# Patient Record
Sex: MALE | Race: WHITE | HISPANIC OR LATINO | Employment: FULL TIME | URBAN - METROPOLITAN AREA
[De-identification: names, ages, dates, MRNs, and addresses within clinical notes are randomized per-mention and may not be internally consistent; named-entity substitution may affect disease eponyms.]

---

## 2019-05-06 ENCOUNTER — PREP FOR PROCEDURE (OUTPATIENT)
Dept: UROLOGY | Facility: CLINIC | Age: 44
End: 2019-05-06

## 2019-05-06 ENCOUNTER — APPOINTMENT (EMERGENCY)
Dept: RADIOLOGY | Facility: HOSPITAL | Age: 44
End: 2019-05-06
Payer: COMMERCIAL

## 2019-05-06 ENCOUNTER — APPOINTMENT (OUTPATIENT)
Dept: URGENT CARE | Age: 44
End: 2019-05-06
Payer: COMMERCIAL

## 2019-05-06 ENCOUNTER — ANESTHESIA (OUTPATIENT)
Dept: PERIOP | Facility: HOSPITAL | Age: 44
End: 2019-05-06
Payer: COMMERCIAL

## 2019-05-06 ENCOUNTER — HOSPITAL ENCOUNTER (OUTPATIENT)
Facility: HOSPITAL | Age: 44
Setting detail: OBSERVATION
Discharge: HOME | End: 2019-05-07
Attending: EMERGENCY MEDICINE | Admitting: INTERNAL MEDICINE
Payer: COMMERCIAL

## 2019-05-06 ENCOUNTER — TELEPHONE (OUTPATIENT)
Dept: UROLOGY | Facility: CLINIC | Age: 44
End: 2019-05-06

## 2019-05-06 ENCOUNTER — APPOINTMENT (OUTPATIENT)
Dept: RADIOLOGY | Facility: HOSPITAL | Age: 44
End: 2019-05-06
Payer: COMMERCIAL

## 2019-05-06 ENCOUNTER — ANESTHESIA EVENT (OUTPATIENT)
Dept: PERIOP | Facility: HOSPITAL | Age: 44
End: 2019-05-06
Payer: COMMERCIAL

## 2019-05-06 DIAGNOSIS — N13.30 HYDRONEPHROSIS OF LEFT KIDNEY: ICD-10-CM

## 2019-05-06 DIAGNOSIS — N20.0 NEPHROLITHIASIS: Primary | ICD-10-CM

## 2019-05-06 DIAGNOSIS — N20.1 LEFT URETERAL CALCULUS: Primary | ICD-10-CM

## 2019-05-06 PROBLEM — D72.829 LEUKOCYTOSIS: Status: ACTIVE | Noted: 2019-05-06

## 2019-05-06 PROBLEM — R03.0 ELEVATED BLOOD PRESSURE READING: Status: ACTIVE | Noted: 2019-05-06

## 2019-05-06 PROBLEM — R11.2 NAUSEA AND VOMITING: Status: ACTIVE | Noted: 2019-05-06

## 2019-05-06 LAB
ALBUMIN SERPL BCP-MCNC: 4.2 G/DL (ref 3.5–5)
ALP SERPL-CCNC: 99 U/L (ref 46–116)
ALT SERPL W P-5'-P-CCNC: 29 U/L (ref 12–78)
ANION GAP SERPL CALCULATED.3IONS-SCNC: 7 MMOL/L (ref 4–13)
AST SERPL W P-5'-P-CCNC: 27 U/L (ref 5–45)
BACTERIA UR QL AUTO: ABNORMAL /HPF
BASOPHILS # BLD AUTO: 0.09 THOUSANDS/ΜL (ref 0–0.1)
BASOPHILS NFR BLD AUTO: 1 % (ref 0–1)
BILIRUB SERPL-MCNC: 0.34 MG/DL (ref 0.2–1)
BILIRUB UR QL STRIP: NEGATIVE
BUN SERPL-MCNC: 14 MG/DL (ref 5–25)
CALCIUM SERPL-MCNC: 9.2 MG/DL (ref 8.3–10.1)
CHLORIDE SERPL-SCNC: 104 MMOL/L (ref 100–108)
CLARITY UR: CLEAR
CO2 SERPL-SCNC: 26 MMOL/L (ref 21–32)
COLOR UR: YELLOW
COLOR, POC: YELLOW
CREAT SERPL-MCNC: 0.96 MG/DL (ref 0.6–1.3)
EOSINOPHIL # BLD AUTO: 0.17 THOUSAND/ΜL (ref 0–0.61)
EOSINOPHIL NFR BLD AUTO: 1 % (ref 0–6)
ERYTHROCYTE [DISTWIDTH] IN BLOOD BY AUTOMATED COUNT: 12.6 % (ref 11.6–15.1)
GFR SERPL CREATININE-BSD FRML MDRD: 111 ML/MIN/1.73SQ M
GLUCOSE SERPL-MCNC: 108 MG/DL (ref 65–140)
GLUCOSE SERPL-MCNC: 130 MG/DL (ref 65–140)
GLUCOSE UR STRIP-MCNC: NEGATIVE MG/DL
HCT VFR BLD AUTO: 44.8 % (ref 36.5–49.3)
HGB BLD-MCNC: 15.4 G/DL (ref 12–17)
HGB UR QL STRIP.AUTO: ABNORMAL
HYALINE CASTS #/AREA URNS LPF: ABNORMAL /LPF
IMM GRANULOCYTES # BLD AUTO: 0.06 THOUSAND/UL (ref 0–0.2)
IMM GRANULOCYTES NFR BLD AUTO: 0 % (ref 0–2)
KETONES UR STRIP-MCNC: NEGATIVE MG/DL
LEUKOCYTE ESTERASE UR QL STRIP: NEGATIVE
LIPASE SERPL-CCNC: 70 U/L (ref 73–393)
LYMPHOCYTES # BLD AUTO: 2.83 THOUSANDS/ΜL (ref 0.6–4.47)
LYMPHOCYTES NFR BLD AUTO: 19 % (ref 14–44)
MCH RBC QN AUTO: 30.6 PG (ref 26.8–34.3)
MCHC RBC AUTO-ENTMCNC: 34.4 G/DL (ref 31.4–37.4)
MCV RBC AUTO: 89 FL (ref 82–98)
MONOCYTES # BLD AUTO: 0.59 THOUSAND/ΜL (ref 0.17–1.22)
MONOCYTES NFR BLD AUTO: 4 % (ref 4–12)
NEUTROPHILS # BLD AUTO: 11.09 THOUSANDS/ΜL (ref 1.85–7.62)
NEUTS SEG NFR BLD AUTO: 75 % (ref 43–75)
NITRITE UR QL STRIP: NEGATIVE
NON-SQ EPI CELLS URNS QL MICRO: ABNORMAL /HPF
NRBC BLD AUTO-RTO: 0 /100 WBCS
PH UR STRIP.AUTO: 7 [PH] (ref 4.5–8)
PLATELET # BLD AUTO: 259 THOUSANDS/UL (ref 149–390)
PMV BLD AUTO: 10 FL (ref 8.9–12.7)
POTASSIUM SERPL-SCNC: 3.6 MMOL/L (ref 3.5–5.3)
PROT SERPL-MCNC: 7.9 G/DL (ref 6.4–8.2)
PROT UR STRIP-MCNC: ABNORMAL MG/DL
RBC # BLD AUTO: 5.04 MILLION/UL (ref 3.88–5.62)
RBC #/AREA URNS AUTO: ABNORMAL /HPF
SODIUM SERPL-SCNC: 137 MMOL/L (ref 136–145)
SP GR UR STRIP.AUTO: 1.02 (ref 1–1.03)
UROBILINOGEN UR QL STRIP.AUTO: 0.2 E.U./DL
WBC # BLD AUTO: 14.83 THOUSAND/UL (ref 4.31–10.16)
WBC #/AREA URNS AUTO: ABNORMAL /HPF

## 2019-05-06 PROCEDURE — 96361 HYDRATE IV INFUSION ADD-ON: CPT

## 2019-05-06 PROCEDURE — 74420 UROGRAPHY RTRGR +-KUB: CPT

## 2019-05-06 PROCEDURE — 99285 EMERGENCY DEPT VISIT HI MDM: CPT

## 2019-05-06 PROCEDURE — 96375 TX/PRO/DX INJ NEW DRUG ADDON: CPT

## 2019-05-06 PROCEDURE — 74177 CT ABD & PELVIS W/CONTRAST: CPT

## 2019-05-06 PROCEDURE — 99219 PR INITIAL OBSERVATION CARE/DAY 50 MINUTES: CPT | Performed by: INTERNAL MEDICINE

## 2019-05-06 PROCEDURE — 80053 COMPREHEN METABOLIC PANEL: CPT | Performed by: EMERGENCY MEDICINE

## 2019-05-06 PROCEDURE — 82948 REAGENT STRIP/BLOOD GLUCOSE: CPT

## 2019-05-06 PROCEDURE — 36415 COLL VENOUS BLD VENIPUNCTURE: CPT

## 2019-05-06 PROCEDURE — 99244 OFF/OP CNSLTJ NEW/EST MOD 40: CPT | Performed by: UROLOGY

## 2019-05-06 PROCEDURE — 85025 COMPLETE CBC W/AUTO DIFF WBC: CPT | Performed by: EMERGENCY MEDICINE

## 2019-05-06 PROCEDURE — 52332 CYSTOSCOPY AND TREATMENT: CPT | Performed by: UROLOGY

## 2019-05-06 PROCEDURE — 83690 ASSAY OF LIPASE: CPT | Performed by: EMERGENCY MEDICINE

## 2019-05-06 PROCEDURE — NC001 PR NO CHARGE: Performed by: UROLOGY

## 2019-05-06 PROCEDURE — 96374 THER/PROPH/DIAG INJ IV PUSH: CPT

## 2019-05-06 PROCEDURE — 81001 URINALYSIS AUTO W/SCOPE: CPT

## 2019-05-06 PROCEDURE — 99203 OFFICE O/P NEW LOW 30 MIN: CPT

## 2019-05-06 PROCEDURE — C2617 STENT, NON-COR, TEM W/O DEL: HCPCS | Performed by: UROLOGY

## 2019-05-06 PROCEDURE — C1769 GUIDE WIRE: HCPCS | Performed by: UROLOGY

## 2019-05-06 DEVICE — STENT URETERAL 6 FR 26CM INLAY OPTIMA: Type: IMPLANTABLE DEVICE | Site: URETER | Status: FUNCTIONAL

## 2019-05-06 RX ORDER — LIDOCAINE HYDROCHLORIDE 10 MG/ML
INJECTION, SOLUTION INFILTRATION; PERINEURAL AS NEEDED
Status: DISCONTINUED | OUTPATIENT
Start: 2019-05-06 | End: 2019-05-06 | Stop reason: SURG

## 2019-05-06 RX ORDER — FENTANYL CITRATE/PF 50 MCG/ML
25 SYRINGE (ML) INJECTION
Status: DISCONTINUED | OUTPATIENT
Start: 2019-05-06 | End: 2019-05-06 | Stop reason: HOSPADM

## 2019-05-06 RX ORDER — ALBUTEROL SULFATE 2.5 MG/3ML
2.5 SOLUTION RESPIRATORY (INHALATION) EVERY 4 HOURS PRN
Status: DISCONTINUED | OUTPATIENT
Start: 2019-05-06 | End: 2019-05-06 | Stop reason: HOSPADM

## 2019-05-06 RX ORDER — SODIUM CHLORIDE 9 MG/ML
150 INJECTION, SOLUTION INTRAVENOUS CONTINUOUS
Status: DISCONTINUED | OUTPATIENT
Start: 2019-05-06 | End: 2019-05-07

## 2019-05-06 RX ORDER — ONDANSETRON 2 MG/ML
4 INJECTION INTRAMUSCULAR; INTRAVENOUS ONCE AS NEEDED
Status: DISCONTINUED | OUTPATIENT
Start: 2019-05-06 | End: 2019-05-06 | Stop reason: HOSPADM

## 2019-05-06 RX ORDER — SODIUM CHLORIDE 9 MG/ML
INJECTION, SOLUTION INTRAVENOUS CONTINUOUS PRN
Status: DISCONTINUED | OUTPATIENT
Start: 2019-05-06 | End: 2019-05-06 | Stop reason: SURG

## 2019-05-06 RX ORDER — FENTANYL CITRATE 50 UG/ML
INJECTION, SOLUTION INTRAMUSCULAR; INTRAVENOUS AS NEEDED
Status: DISCONTINUED | OUTPATIENT
Start: 2019-05-06 | End: 2019-05-06 | Stop reason: SURG

## 2019-05-06 RX ORDER — TAMSULOSIN HYDROCHLORIDE 0.4 MG/1
0.4 CAPSULE ORAL
Qty: 30 CAPSULE | Refills: 0 | Status: SHIPPED | OUTPATIENT
Start: 2019-05-06 | End: 2019-06-03 | Stop reason: HOSPADM

## 2019-05-06 RX ORDER — KETOROLAC TROMETHAMINE 30 MG/ML
15 INJECTION, SOLUTION INTRAMUSCULAR; INTRAVENOUS ONCE
Status: COMPLETED | OUTPATIENT
Start: 2019-05-06 | End: 2019-05-06

## 2019-05-06 RX ORDER — DOCUSATE SODIUM 100 MG/1
100 CAPSULE, LIQUID FILLED ORAL 3 TIMES DAILY
Qty: 90 CAPSULE | Refills: 0 | Status: SHIPPED | OUTPATIENT
Start: 2019-05-06 | End: 2020-04-21 | Stop reason: ALTCHOICE

## 2019-05-06 RX ORDER — PHENAZOPYRIDINE HYDROCHLORIDE 200 MG/1
200 TABLET, FILM COATED ORAL
Qty: 6 TABLET | Refills: 0 | Status: SHIPPED | OUTPATIENT
Start: 2019-05-06 | End: 2019-05-08

## 2019-05-06 RX ORDER — ONDANSETRON 2 MG/ML
4 INJECTION INTRAMUSCULAR; INTRAVENOUS ONCE
Status: COMPLETED | OUTPATIENT
Start: 2019-05-06 | End: 2019-05-06

## 2019-05-06 RX ORDER — TAMSULOSIN HYDROCHLORIDE 0.4 MG/1
0.4 CAPSULE ORAL
Status: DISCONTINUED | OUTPATIENT
Start: 2019-05-06 | End: 2019-05-07 | Stop reason: HOSPADM

## 2019-05-06 RX ORDER — OXYCODONE HYDROCHLORIDE AND ACETAMINOPHEN 5; 325 MG/1; MG/1
1 TABLET ORAL EVERY 6 HOURS PRN
Qty: 10 TABLET | Refills: 0 | Status: SHIPPED | OUTPATIENT
Start: 2019-05-06 | End: 2019-05-11

## 2019-05-06 RX ORDER — HYDROMORPHONE HCL/PF 1 MG/ML
0.2 SYRINGE (ML) INJECTION
Status: DISCONTINUED | OUTPATIENT
Start: 2019-05-06 | End: 2019-05-06 | Stop reason: HOSPADM

## 2019-05-06 RX ORDER — MAGNESIUM HYDROXIDE 1200 MG/15ML
LIQUID ORAL AS NEEDED
Status: DISCONTINUED | OUTPATIENT
Start: 2019-05-06 | End: 2019-05-06 | Stop reason: HOSPADM

## 2019-05-06 RX ORDER — ONDANSETRON 2 MG/ML
4 INJECTION INTRAMUSCULAR; INTRAVENOUS EVERY 6 HOURS PRN
Status: DISCONTINUED | OUTPATIENT
Start: 2019-05-06 | End: 2019-05-07 | Stop reason: HOSPADM

## 2019-05-06 RX ORDER — ONDANSETRON 2 MG/ML
INJECTION INTRAMUSCULAR; INTRAVENOUS AS NEEDED
Status: DISCONTINUED | OUTPATIENT
Start: 2019-05-06 | End: 2019-05-06 | Stop reason: SURG

## 2019-05-06 RX ORDER — KETOROLAC TROMETHAMINE 30 MG/ML
15 INJECTION, SOLUTION INTRAMUSCULAR; INTRAVENOUS EVERY 6 HOURS PRN
Status: DISCONTINUED | OUTPATIENT
Start: 2019-05-06 | End: 2019-05-07 | Stop reason: HOSPADM

## 2019-05-06 RX ORDER — SODIUM CHLORIDE 9 MG/ML
125 INJECTION, SOLUTION INTRAVENOUS CONTINUOUS
Status: DISCONTINUED | OUTPATIENT
Start: 2019-05-06 | End: 2019-05-07 | Stop reason: HOSPADM

## 2019-05-06 RX ORDER — DEXAMETHASONE SODIUM PHOSPHATE 10 MG/ML
INJECTION, SOLUTION INTRAMUSCULAR; INTRAVENOUS AS NEEDED
Status: DISCONTINUED | OUTPATIENT
Start: 2019-05-06 | End: 2019-05-06 | Stop reason: SURG

## 2019-05-06 RX ORDER — METOCLOPRAMIDE HYDROCHLORIDE 5 MG/ML
10 INJECTION INTRAMUSCULAR; INTRAVENOUS ONCE
Status: COMPLETED | OUTPATIENT
Start: 2019-05-06 | End: 2019-05-06

## 2019-05-06 RX ORDER — MIDAZOLAM HYDROCHLORIDE 1 MG/ML
INJECTION INTRAMUSCULAR; INTRAVENOUS AS NEEDED
Status: DISCONTINUED | OUTPATIENT
Start: 2019-05-06 | End: 2019-05-06 | Stop reason: SURG

## 2019-05-06 RX ORDER — ACETAMINOPHEN 325 MG/1
650 TABLET ORAL EVERY 6 HOURS PRN
Status: DISCONTINUED | OUTPATIENT
Start: 2019-05-06 | End: 2019-05-07 | Stop reason: HOSPADM

## 2019-05-06 RX ORDER — PROPOFOL 10 MG/ML
INJECTION, EMULSION INTRAVENOUS AS NEEDED
Status: DISCONTINUED | OUTPATIENT
Start: 2019-05-06 | End: 2019-05-06 | Stop reason: SURG

## 2019-05-06 RX ORDER — SUCCINYLCHOLINE/SOD CL,ISO/PF 100 MG/5ML
SYRINGE (ML) INTRAVENOUS AS NEEDED
Status: DISCONTINUED | OUTPATIENT
Start: 2019-05-06 | End: 2019-05-06 | Stop reason: SURG

## 2019-05-06 RX ADMIN — SODIUM CHLORIDE: 9 INJECTION, SOLUTION INTRAVENOUS at 18:16

## 2019-05-06 RX ADMIN — KETOROLAC TROMETHAMINE 15 MG: 30 INJECTION, SOLUTION INTRAMUSCULAR at 13:42

## 2019-05-06 RX ADMIN — LIDOCAINE HYDROCHLORIDE 50 MG: 10 INJECTION, SOLUTION INFILTRATION; PERINEURAL at 19:59

## 2019-05-06 RX ADMIN — DEXAMETHASONE SODIUM PHOSPHATE 10 MG: 10 INJECTION, SOLUTION INTRAMUSCULAR; INTRAVENOUS at 19:59

## 2019-05-06 RX ADMIN — SODIUM CHLORIDE 150 ML/HR: 0.9 INJECTION, SOLUTION INTRAVENOUS at 18:41

## 2019-05-06 RX ADMIN — SODIUM CHLORIDE 1000 ML: 0.9 INJECTION, SOLUTION INTRAVENOUS at 13:07

## 2019-05-06 RX ADMIN — Medication 100 MG: at 19:59

## 2019-05-06 RX ADMIN — METOCLOPRAMIDE 10 MG: 5 INJECTION, SOLUTION INTRAMUSCULAR; INTRAVENOUS at 15:56

## 2019-05-06 RX ADMIN — KETOROLAC TROMETHAMINE 15 MG: 30 INJECTION, SOLUTION INTRAMUSCULAR at 18:07

## 2019-05-06 RX ADMIN — IOHEXOL 100 ML: 350 INJECTION, SOLUTION INTRAVENOUS at 14:42

## 2019-05-06 RX ADMIN — MIDAZOLAM 2 MG: 1 INJECTION INTRAMUSCULAR; INTRAVENOUS at 19:48

## 2019-05-06 RX ADMIN — ONDANSETRON 4 MG: 2 INJECTION INTRAMUSCULAR; INTRAVENOUS at 19:56

## 2019-05-06 RX ADMIN — TAMSULOSIN HYDROCHLORIDE 0.4 MG: 0.4 CAPSULE ORAL at 18:40

## 2019-05-06 RX ADMIN — SODIUM CHLORIDE 1000 ML: 0.9 INJECTION, SOLUTION INTRAVENOUS at 15:56

## 2019-05-06 RX ADMIN — FENTANYL CITRATE 25 MCG: 50 INJECTION, SOLUTION INTRAMUSCULAR; INTRAVENOUS at 20:52

## 2019-05-06 RX ADMIN — ONDANSETRON 4 MG: 2 INJECTION INTRAMUSCULAR; INTRAVENOUS at 13:07

## 2019-05-06 RX ADMIN — FENTANYL CITRATE 25 MCG: 50 INJECTION, SOLUTION INTRAMUSCULAR; INTRAVENOUS at 20:46

## 2019-05-06 RX ADMIN — PROPOFOL 200 MG: 10 INJECTION, EMULSION INTRAVENOUS at 19:59

## 2019-05-06 RX ADMIN — FENTANYL CITRATE 50 MCG: 50 INJECTION, SOLUTION INTRAMUSCULAR; INTRAVENOUS at 19:58

## 2019-05-06 RX ADMIN — CEFTRIAXONE SODIUM 1000 MG: 10 INJECTION, POWDER, FOR SOLUTION INTRAVENOUS at 15:56

## 2019-05-06 RX ADMIN — SODIUM CHLORIDE 150 ML/HR: 0.9 INJECTION, SOLUTION INTRAVENOUS at 21:01

## 2019-05-07 VITALS
SYSTOLIC BLOOD PRESSURE: 140 MMHG | OXYGEN SATURATION: 97 % | TEMPERATURE: 98.3 F | RESPIRATION RATE: 18 BRPM | HEART RATE: 98 BPM | WEIGHT: 167 LBS | DIASTOLIC BLOOD PRESSURE: 86 MMHG

## 2019-05-07 PROBLEM — D72.829 LEUKOCYTOSIS: Status: RESOLVED | Noted: 2019-05-06 | Resolved: 2019-05-07

## 2019-05-07 PROBLEM — R11.2 NAUSEA AND VOMITING: Status: RESOLVED | Noted: 2019-05-06 | Resolved: 2019-05-07

## 2019-05-07 LAB
ANION GAP SERPL CALCULATED.3IONS-SCNC: 5 MMOL/L (ref 4–13)
BASOPHILS # BLD AUTO: 0.01 THOUSANDS/ΜL (ref 0–0.1)
BASOPHILS NFR BLD AUTO: 0 % (ref 0–1)
BUN SERPL-MCNC: 12 MG/DL (ref 5–25)
CALCIUM SERPL-MCNC: 8.3 MG/DL (ref 8.3–10.1)
CHLORIDE SERPL-SCNC: 109 MMOL/L (ref 100–108)
CO2 SERPL-SCNC: 23 MMOL/L (ref 21–32)
CREAT SERPL-MCNC: 0.9 MG/DL (ref 0.6–1.3)
EOSINOPHIL # BLD AUTO: 0 THOUSAND/ΜL (ref 0–0.61)
EOSINOPHIL NFR BLD AUTO: 0 % (ref 0–6)
ERYTHROCYTE [DISTWIDTH] IN BLOOD BY AUTOMATED COUNT: 12.6 % (ref 11.6–15.1)
GFR SERPL CREATININE-BSD FRML MDRD: 120 ML/MIN/1.73SQ M
GLUCOSE SERPL-MCNC: 143 MG/DL (ref 65–140)
GLUCOSE SERPL-MCNC: 146 MG/DL (ref 65–140)
GLUCOSE SERPL-MCNC: 165 MG/DL (ref 65–140)
HCT VFR BLD AUTO: 43.3 % (ref 36.5–49.3)
HGB BLD-MCNC: 14.8 G/DL (ref 12–17)
IMM GRANULOCYTES # BLD AUTO: 0.02 THOUSAND/UL (ref 0–0.2)
IMM GRANULOCYTES NFR BLD AUTO: 0 % (ref 0–2)
LYMPHOCYTES # BLD AUTO: 1.22 THOUSANDS/ΜL (ref 0.6–4.47)
LYMPHOCYTES NFR BLD AUTO: 16 % (ref 14–44)
MCH RBC QN AUTO: 30.5 PG (ref 26.8–34.3)
MCHC RBC AUTO-ENTMCNC: 34.2 G/DL (ref 31.4–37.4)
MCV RBC AUTO: 89 FL (ref 82–98)
MONOCYTES # BLD AUTO: 0.1 THOUSAND/ΜL (ref 0.17–1.22)
MONOCYTES NFR BLD AUTO: 1 % (ref 4–12)
NEUTROPHILS # BLD AUTO: 6.44 THOUSANDS/ΜL (ref 1.85–7.62)
NEUTS SEG NFR BLD AUTO: 83 % (ref 43–75)
NRBC BLD AUTO-RTO: 0 /100 WBCS
PLATELET # BLD AUTO: 246 THOUSANDS/UL (ref 149–390)
PMV BLD AUTO: 10.3 FL (ref 8.9–12.7)
POTASSIUM SERPL-SCNC: 4.3 MMOL/L (ref 3.5–5.3)
RBC # BLD AUTO: 4.86 MILLION/UL (ref 3.88–5.62)
SODIUM SERPL-SCNC: 137 MMOL/L (ref 136–145)
WBC # BLD AUTO: 7.79 THOUSAND/UL (ref 4.31–10.16)

## 2019-05-07 PROCEDURE — 80048 BASIC METABOLIC PNL TOTAL CA: CPT | Performed by: UROLOGY

## 2019-05-07 PROCEDURE — 85025 COMPLETE CBC W/AUTO DIFF WBC: CPT | Performed by: UROLOGY

## 2019-05-07 PROCEDURE — 99225 PR SBSQ OBSERVATION CARE/DAY 25 MINUTES: CPT | Performed by: PHYSICIAN ASSISTANT

## 2019-05-07 PROCEDURE — 82948 REAGENT STRIP/BLOOD GLUCOSE: CPT

## 2019-05-07 RX ORDER — TAMSULOSIN HYDROCHLORIDE 0.4 MG/1
0.4 CAPSULE ORAL
Refills: 0
Start: 2019-05-07 | End: 2019-05-07 | Stop reason: HOSPADM

## 2019-05-07 RX ADMIN — ACETAMINOPHEN 650 MG: 325 TABLET, FILM COATED ORAL at 11:33

## 2019-05-07 RX ADMIN — SODIUM CHLORIDE 150 ML/HR: 0.9 INJECTION, SOLUTION INTRAVENOUS at 05:43

## 2019-05-15 ENCOUNTER — OFFICE VISIT (OUTPATIENT)
Dept: UROLOGY | Facility: CLINIC | Age: 44
End: 2019-05-15
Payer: COMMERCIAL

## 2019-05-15 VITALS
SYSTOLIC BLOOD PRESSURE: 122 MMHG | WEIGHT: 173.4 LBS | DIASTOLIC BLOOD PRESSURE: 78 MMHG | BODY MASS INDEX: 29.6 KG/M2 | HEIGHT: 64 IN | HEART RATE: 86 BPM

## 2019-05-15 DIAGNOSIS — N20.0 NEPHROLITHIASIS: Primary | ICD-10-CM

## 2019-05-15 PROCEDURE — 99214 OFFICE O/P EST MOD 30 MIN: CPT | Performed by: UROLOGY

## 2019-05-15 RX ORDER — CEFAZOLIN SODIUM 1 G/50ML
1000 SOLUTION INTRAVENOUS ONCE
Status: CANCELLED | OUTPATIENT
Start: 2019-06-03 | End: 2019-05-15

## 2019-05-15 RX ORDER — SODIUM CHLORIDE 9 MG/ML
125 INJECTION, SOLUTION INTRAVENOUS CONTINUOUS
Status: CANCELLED | OUTPATIENT
Start: 2019-06-03

## 2019-05-31 ENCOUNTER — ANESTHESIA EVENT (OUTPATIENT)
Dept: PERIOP | Facility: HOSPITAL | Age: 44
End: 2019-05-31
Payer: COMMERCIAL

## 2019-06-03 ENCOUNTER — TELEPHONE (OUTPATIENT)
Dept: UROLOGY | Facility: AMBULATORY SURGERY CENTER | Age: 44
End: 2019-06-03

## 2019-06-03 ENCOUNTER — HOSPITAL ENCOUNTER (OUTPATIENT)
Facility: HOSPITAL | Age: 44
Setting detail: OUTPATIENT SURGERY
Discharge: HOME | End: 2019-06-03
Attending: UROLOGY | Admitting: UROLOGY
Payer: COMMERCIAL

## 2019-06-03 ENCOUNTER — APPOINTMENT (OUTPATIENT)
Dept: RADIOLOGY | Facility: HOSPITAL | Age: 44
End: 2019-06-03
Payer: COMMERCIAL

## 2019-06-03 ENCOUNTER — ANESTHESIA (OUTPATIENT)
Dept: PERIOP | Facility: HOSPITAL | Age: 44
End: 2019-06-03
Payer: COMMERCIAL

## 2019-06-03 VITALS
OXYGEN SATURATION: 99 % | RESPIRATION RATE: 18 BRPM | SYSTOLIC BLOOD PRESSURE: 146 MMHG | BODY MASS INDEX: 27.15 KG/M2 | WEIGHT: 173 LBS | HEIGHT: 67 IN | DIASTOLIC BLOOD PRESSURE: 83 MMHG | HEART RATE: 70 BPM | TEMPERATURE: 97.6 F

## 2019-06-03 DIAGNOSIS — N20.0 NEPHROLITHIASIS: Primary | ICD-10-CM

## 2019-06-03 DIAGNOSIS — N20.1 LEFT URETERAL CALCULUS: Primary | ICD-10-CM

## 2019-06-03 DIAGNOSIS — N20.0 NEPHROLITHIASIS: ICD-10-CM

## 2019-06-03 PROCEDURE — C1758 CATHETER, URETERAL: HCPCS | Performed by: UROLOGY

## 2019-06-03 PROCEDURE — 74420 UROGRAPHY RTRGR +-KUB: CPT

## 2019-06-03 PROCEDURE — 52356 CYSTO/URETERO W/LITHOTRIPSY: CPT | Performed by: UROLOGY

## 2019-06-03 PROCEDURE — C2617 STENT, NON-COR, TEM W/O DEL: HCPCS | Performed by: UROLOGY

## 2019-06-03 PROCEDURE — 87086 URINE CULTURE/COLONY COUNT: CPT | Performed by: UROLOGY

## 2019-06-03 PROCEDURE — C1769 GUIDE WIRE: HCPCS | Performed by: UROLOGY

## 2019-06-03 DEVICE — STENT URETERAL 6 FR 26CM INLAY OPTIMA: Type: IMPLANTABLE DEVICE | Site: URETER | Status: FUNCTIONAL

## 2019-06-03 RX ORDER — HYDROCODONE BITARTRATE AND ACETAMINOPHEN 5; 325 MG/1; MG/1
2 TABLET ORAL EVERY 6 HOURS PRN
Qty: 8 TABLET | Refills: 0 | Status: SHIPPED | OUTPATIENT
Start: 2019-06-03 | End: 2019-06-13

## 2019-06-03 RX ORDER — MIDAZOLAM HYDROCHLORIDE 1 MG/ML
INJECTION INTRAMUSCULAR; INTRAVENOUS AS NEEDED
Status: DISCONTINUED | OUTPATIENT
Start: 2019-06-03 | End: 2019-06-03 | Stop reason: SURG

## 2019-06-03 RX ORDER — SODIUM CHLORIDE 9 MG/ML
125 INJECTION, SOLUTION INTRAVENOUS CONTINUOUS
Status: DISCONTINUED | OUTPATIENT
Start: 2019-06-03 | End: 2019-06-03 | Stop reason: HOSPADM

## 2019-06-03 RX ORDER — MAGNESIUM HYDROXIDE 1200 MG/15ML
LIQUID ORAL AS NEEDED
Status: DISCONTINUED | OUTPATIENT
Start: 2019-06-03 | End: 2019-06-03 | Stop reason: HOSPADM

## 2019-06-03 RX ORDER — CEFAZOLIN SODIUM 1 G/50ML
1000 SOLUTION INTRAVENOUS ONCE
Status: COMPLETED | OUTPATIENT
Start: 2019-06-03 | End: 2019-06-03

## 2019-06-03 RX ORDER — FENTANYL CITRATE 50 UG/ML
INJECTION, SOLUTION INTRAMUSCULAR; INTRAVENOUS AS NEEDED
Status: DISCONTINUED | OUTPATIENT
Start: 2019-06-03 | End: 2019-06-03 | Stop reason: SURG

## 2019-06-03 RX ORDER — ONDANSETRON 2 MG/ML
4 INJECTION INTRAMUSCULAR; INTRAVENOUS ONCE AS NEEDED
Status: COMPLETED | OUTPATIENT
Start: 2019-06-03 | End: 2019-06-03

## 2019-06-03 RX ORDER — ONDANSETRON 2 MG/ML
INJECTION INTRAMUSCULAR; INTRAVENOUS AS NEEDED
Status: DISCONTINUED | OUTPATIENT
Start: 2019-06-03 | End: 2019-06-03 | Stop reason: SURG

## 2019-06-03 RX ORDER — HYDROCODONE BITARTRATE AND ACETAMINOPHEN 5; 325 MG/1; MG/1
2 TABLET ORAL EVERY 4 HOURS PRN
Status: DISCONTINUED | OUTPATIENT
Start: 2019-06-03 | End: 2019-06-03 | Stop reason: HOSPADM

## 2019-06-03 RX ORDER — DEXAMETHASONE SODIUM PHOSPHATE 4 MG/ML
INJECTION, SOLUTION INTRA-ARTICULAR; INTRALESIONAL; INTRAMUSCULAR; INTRAVENOUS; SOFT TISSUE AS NEEDED
Status: DISCONTINUED | OUTPATIENT
Start: 2019-06-03 | End: 2019-06-03 | Stop reason: SURG

## 2019-06-03 RX ORDER — FENTANYL CITRATE/PF 50 MCG/ML
25 SYRINGE (ML) INJECTION
Status: DISCONTINUED | OUTPATIENT
Start: 2019-06-03 | End: 2019-06-03 | Stop reason: HOSPADM

## 2019-06-03 RX ORDER — CEPHALEXIN 500 MG/1
500 CAPSULE ORAL 3 TIMES DAILY
Qty: 9 CAPSULE | Refills: 0 | Status: SHIPPED | OUTPATIENT
Start: 2019-06-03 | End: 2019-06-06

## 2019-06-03 RX ORDER — PROPOFOL 10 MG/ML
INJECTION, EMULSION INTRAVENOUS AS NEEDED
Status: DISCONTINUED | OUTPATIENT
Start: 2019-06-03 | End: 2019-06-03 | Stop reason: SURG

## 2019-06-03 RX ADMIN — DEXAMETHASONE SODIUM PHOSPHATE 4 MG: 4 INJECTION, SOLUTION INTRAMUSCULAR; INTRAVENOUS at 14:08

## 2019-06-03 RX ADMIN — FENTANYL CITRATE 25 MCG: 50 INJECTION, SOLUTION INTRAMUSCULAR; INTRAVENOUS at 15:40

## 2019-06-03 RX ADMIN — ONDANSETRON HYDROCHLORIDE 4 MG: 2 INJECTION, SOLUTION INTRAVENOUS at 14:08

## 2019-06-03 RX ADMIN — CEFAZOLIN SODIUM 1000 MG: 1 SOLUTION INTRAVENOUS at 14:02

## 2019-06-03 RX ADMIN — FENTANYL CITRATE 25 MCG: 50 INJECTION, SOLUTION INTRAMUSCULAR; INTRAVENOUS at 15:49

## 2019-06-03 RX ADMIN — HYDROCODONE BITARTRATE AND ACETAMINOPHEN 2 TABLET: 5; 325 TABLET ORAL at 16:28

## 2019-06-03 RX ADMIN — MIDAZOLAM 2 MG: 1 INJECTION INTRAMUSCULAR; INTRAVENOUS at 13:51

## 2019-06-03 RX ADMIN — PROPOFOL 200 MG: 10 INJECTION, EMULSION INTRAVENOUS at 14:02

## 2019-06-03 RX ADMIN — FENTANYL CITRATE 25 MCG: 50 INJECTION, SOLUTION INTRAMUSCULAR; INTRAVENOUS at 15:44

## 2019-06-03 RX ADMIN — ONDANSETRON 4 MG: 2 INJECTION INTRAMUSCULAR; INTRAVENOUS at 15:31

## 2019-06-03 RX ADMIN — FENTANYL CITRATE 25 MCG: 50 INJECTION, SOLUTION INTRAMUSCULAR; INTRAVENOUS at 14:19

## 2019-06-03 RX ADMIN — FENTANYL CITRATE 25 MCG: 50 INJECTION, SOLUTION INTRAMUSCULAR; INTRAVENOUS at 14:11

## 2019-06-03 RX ADMIN — SODIUM CHLORIDE: 0.9 INJECTION, SOLUTION INTRAVENOUS at 14:48

## 2019-06-03 RX ADMIN — LIDOCAINE HYDROCHLORIDE 100 MG: 20 INJECTION, SOLUTION INTRAVENOUS at 14:02

## 2019-06-03 RX ADMIN — FENTANYL CITRATE 25 MCG: 50 INJECTION, SOLUTION INTRAMUSCULAR; INTRAVENOUS at 14:15

## 2019-06-03 RX ADMIN — FENTANYL CITRATE 25 MCG: 50 INJECTION, SOLUTION INTRAMUSCULAR; INTRAVENOUS at 14:02

## 2019-06-03 RX ADMIN — SODIUM CHLORIDE 125 ML/HR: 0.9 INJECTION, SOLUTION INTRAVENOUS at 12:48

## 2019-06-05 LAB — BACTERIA UR CULT: NORMAL

## 2019-07-16 ENCOUNTER — OFFICE VISIT (OUTPATIENT)
Dept: FAMILY MEDICINE CLINIC | Facility: CLINIC | Age: 44
End: 2019-07-16

## 2019-07-16 VITALS
BODY MASS INDEX: 27.31 KG/M2 | WEIGHT: 174 LBS | TEMPERATURE: 97.9 F | DIASTOLIC BLOOD PRESSURE: 80 MMHG | OXYGEN SATURATION: 99 % | HEIGHT: 67 IN | SYSTOLIC BLOOD PRESSURE: 138 MMHG | RESPIRATION RATE: 18 BRPM | HEART RATE: 82 BPM

## 2019-07-16 DIAGNOSIS — I83.12 VARICOSE VEINS OF BOTH LOWER EXTREMITIES WITH INFLAMMATION: Primary | ICD-10-CM

## 2019-07-16 DIAGNOSIS — I83.11 VARICOSE VEINS OF BOTH LOWER EXTREMITIES WITH INFLAMMATION: Primary | ICD-10-CM

## 2019-07-16 PROCEDURE — 99213 OFFICE O/P EST LOW 20 MIN: CPT | Performed by: FAMILY MEDICINE

## 2019-07-16 PROCEDURE — 3008F BODY MASS INDEX DOCD: CPT | Performed by: FAMILY MEDICINE

## 2019-07-16 NOTE — PROGRESS NOTES
Assessment/Plan:    Left ureteral calculus   Patient underwent surgery procedure of left side kidney stent placement by Urology on May 6, 2019 followed by left side kidney stone removal by lithotripsy on Litzy 3, 2019  Patient was evaluated today for left side back pain and discomfort  Physical exam showed no significant findings  Patient has an upcoming appointment with Urologist on August, 2019 which encouraged patient to explain his symptoms and concerns to Urologist to further assess his condition  Varicose veins of both lower extremities with inflammation  Patient with presence of varicose veins in lower extremities bilaterally associated with mild ankle swelling  Ordered vascular surgery referral for further evaluation and encouraged patient to perform alleviating techniques such as elevating legs with resting to see if it provides relief and follow up in clinic in about 1 month for evaluation  Diagnoses and all orders for this visit:    Varicose veins of both lower extremities with inflammation  -     Ambulatory referral to Vascular Surgery; Future        Subjective:      Patient ID: Tavon Adames is a 39 y o  male  Case of 39year old male patient who came to the clinic today due to low back pain and discomfort  Patient indicates that had surgery procedure for left side kidney stent placement by Urology on May 6, 2019 and afterwards had kidney stone removal by lithotripsy on Litzy 3, 2019  Patient indicates that he was cleared to return to work a few days after the procedure in June, but he has noted that has been having left side pain and discomfort when performing activities 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  Pain described as sharp, 7/10 in intensity, located on the left side of the back without irradiation, aggravated by heavy lifting and alleviated with Tylenol   Patient denies any discomfort when urinating, no presence of blood in urine and no additional episodes of kidney stones  Patient also indicates that he has had presence of varicose veins in both lower extremities for a long time, but has noted that for the past three months has worsened, associated with bilateral ankle swelling and wanted to know about recommendations regarding this symptoms  Review of Systems   Constitutional: Positive for activity change  Respiratory: Negative for chest tightness and shortness of breath  Cardiovascular: Positive for leg swelling  Negative for chest pain  Gastrointestinal: Negative for abdominal pain  Genitourinary: Positive for flank pain  Negative for difficulty urinating, dysuria, frequency and hematuria  Musculoskeletal: Positive for back pain  Bilateral lower extremity varicose veins swelling and discomfort   Skin: Negative for color change  Objective:      /80 (BP Location: Left arm, Patient Position: Sitting, Cuff Size: Standard)   Pulse 82   Temp 97 9 °F (36 6 °C) (Temporal)   Resp 18   Ht 5' 7" (1 702 m)   Wt 78 9 kg (174 lb)   SpO2 99%   BMI 27 25 kg/m²          Physical Exam   Constitutional: He appears well-developed and well-nourished  No distress  Eyes: EOM are normal    Cardiovascular: Normal heart sounds  Pulmonary/Chest: Effort normal and breath sounds normal    Musculoskeletal: Normal range of motion  Neurological: He is alert  Skin: Skin is warm  He is not diaphoretic  Psychiatric: He has a normal mood and affect

## 2019-07-16 NOTE — ASSESSMENT & PLAN NOTE
Patient underwent surgery procedure of left side kidney stent placement by Urology on May 6, 2019 followed by left side kidney stone removal by lithotripsy on Litzy 3, 2019  Patient was evaluated today for left side back pain and discomfort  Physical exam showed no significant findings  Patient has an upcoming appointment with Urologist on August, 2019 which encouraged patient to explain his symptoms and concerns to Urologist to further assess his condition

## 2019-07-23 ENCOUNTER — CONSULT (OUTPATIENT)
Dept: VASCULAR SURGERY | Facility: CLINIC | Age: 44
End: 2019-07-23
Payer: COMMERCIAL

## 2019-07-23 VITALS
HEIGHT: 67 IN | TEMPERATURE: 98.4 F | BODY MASS INDEX: 27.31 KG/M2 | SYSTOLIC BLOOD PRESSURE: 142 MMHG | WEIGHT: 174 LBS | DIASTOLIC BLOOD PRESSURE: 82 MMHG | HEART RATE: 84 BPM

## 2019-07-23 DIAGNOSIS — I83.11 VARICOSE VEINS OF BOTH LOWER EXTREMITIES WITH INFLAMMATION: ICD-10-CM

## 2019-07-23 DIAGNOSIS — I83.12 VARICOSE VEINS OF BOTH LOWER EXTREMITIES WITH INFLAMMATION: ICD-10-CM

## 2019-07-23 PROCEDURE — 99244 OFF/OP CNSLTJ NEW/EST MOD 40: CPT | Performed by: NURSE PRACTITIONER

## 2019-07-23 NOTE — ASSESSMENT & PLAN NOTE
38yo Welsh speaking male with PMH kidney stones presents with bilateral lower extremity symptomatic varicose veins  Patient reports veins have been present for several years  There is starting to affect his work  He has aching, tired, heavy legs with burning and itching along with swelling to the lower extremities  He takes Tylenol as needed  Elevates his legs when he can  He has a family history of varicose veins, father  We discussed the pathophysiology of varicose veins and venous insufficiency  Patient expressed understanding  Will start with conservative measures to aid in symptom relief and progression of disease      PLAN:  -compression stockings 20-30mmHg Rx given, to be worn during waking hours and removed at bedtime  -elevate legs throughout the day as able  -exercise daily as tolerated  -continue weight loss and low-fat low-chol, low-sodium diet  -LEVDR in 3 months  -return for f/u with Vascular Surgeon after testing to discuss treatment plan  -if you have any questions or concerns, please call our office to discuss

## 2019-07-23 NOTE — PROGRESS NOTES
Assessment/Plan:    Varicose veins of both lower extremities with inflammation  38yo Haitian speaking male with PMH kidney stones presents with bilateral lower extremity symptomatic varicose veins  Patient reports veins have been present for several years  There is starting to affect his work  He has aching, tired, heavy legs with burning and itching along with swelling to the lower extremities  He takes Tylenol as needed  Elevates his legs when he can  He has a family history of varicose veins, father  We discussed the pathophysiology of varicose veins and venous insufficiency  Patient expressed understanding  Will start with conservative measures to aid in symptom relief and progression of disease  PLAN:  -compression stockings 20-30mmHg Rx given, to be worn during waking hours and removed at bedtime  -elevate legs throughout the day as able  -exercise daily as tolerated  -continue weight loss and low-fat low-chol, low-sodium diet  -LEVDR in 3 months  -return for f/u with Vascular Surgeon after testing to discuss treatment plan  -if you have any questions or concerns, please call our office to discuss         Diagnoses and all orders for this visit:    Varicose veins of both lower extremities with inflammation  -     Ambulatory referral to Vascular Surgery  -     Compression Stocking  -     VAS reflux lower limb venous duplex study with reflux assessment, complete bilateral; Future          Patient ID: Tavon Adames is a 39 y o  male  Chief complaint; Pt is new to our practice here to discuss VV to bilateral legs  Pt was referred by PCP, he has not had any recent testing  Pt states he has had the veins for a while  Pt states the veins are interfering with work  Pt states veins are bulging, itchy and swelling  Pt s elevating but denies use of compression  Amandacristina Mcclelland is a 38yo Haitian speaking male with PMH kidney stones presents with bilateral lower extremity symptomatic varicose veins    Patient reports veins have been present for several years  There is starting to affect his work, as he stands on his feet all day  He has aching, tired, heavy legs with burning and itching along with swelling to the lower extremities  He also complains of pain associated with the veins  He takes Tylenol as needed  Elevates his legs when he can  He does ambulate, does not do much in the way of exercise  He has a family history of varicose veins, father  He is a nonsmoker  He does not wear any compression stockings  He has never had any previous treatment varicose veins  He denies any history of phlebitis, SVT/DVT, bleeding or clotting disorders  [facility] [place] [place]  [id number] was used for today's visit    The following portions of the patient's history were reviewed and updated as appropriate: allergies, current medications, past family history, past medical history, past social history, past surgical history and problem list     Review of Systems   Constitutional: Negative  HENT: Negative  Eyes: Negative  Respiratory: Negative  Cardiovascular: Positive for leg swelling  Painful veins     Gastrointestinal: Negative  Endocrine: Negative  Genitourinary: Negative  Musculoskeletal: Negative  Skin: Negative  Allergic/Immunologic: Negative  Neurological: Negative  Hematological: Negative  Psychiatric/Behavioral: Negative  Objective:      /82 (BP Location: Right arm, Patient Position: Sitting, Cuff Size: Adult)   Pulse 84   Temp 98 4 °F (36 9 °C) (Tympanic)   Ht 5' 7" (1 702 m)   Wt 78 9 kg (174 lb)   BMI 27 25 kg/m²          Physical Exam   Constitutional: He is oriented to person, place, and time  He appears well-developed and well-nourished  HENT:   Head: Normocephalic and atraumatic  Eyes: Pupils are equal, round, and reactive to light  EOM are normal  No scleral icterus  Neck: Normal range of motion     Cardiovascular: Normal rate, regular rhythm, normal heart sounds and intact distal pulses  Pulmonary/Chest: Effort normal and breath sounds normal    Abdominal: Soft  Bowel sounds are normal    Musculoskeletal: Normal range of motion  Neurological: He is alert and oriented to person, place, and time  He has normal strength  Skin: Skin is warm, dry and intact  Capillary refill takes less than 2 seconds  Psychiatric: He has a normal mood and affect  His speech is normal and behavior is normal  Judgment and thought content normal  Cognition and memory are normal    Nursing note and vitals reviewed  I have reviewed and made appropriate changes to the review of systems input by the medical assistant  Vitals:    07/23/19 1320   BP: 142/82   BP Location: Right arm   Patient Position: Sitting   Cuff Size: Adult   Pulse: 84   Temp: 98 4 °F (36 9 °C)   TempSrc: Tympanic   Weight: 78 9 kg (174 lb)   Height: 5' 7" (1 702 m)       Patient Active Problem List   Diagnosis    Left ureteral calculus    Elevated blood pressure reading    Nephrolithiasis    Varicose veins of both lower extremities with inflammation       Past Surgical History:   Procedure Laterality Date    FL RETROGRADE PYELOGRAM  5/6/2019    FL RETROGRADE PYELOGRAM  6/3/2019    [place] CYSTO/URETERO W/LITHOTRIPSY &INDWELL STENT INSRT Left 6/3/2019    Procedure: CYSTO, URETEROSCOPY W/HOLMIUM LASER, RETROGRADE PYELOGRAM, STENT INSERTION;  Surgeon: Cristiano Kelly MD;  [facility];  Service: Urology    [place] CYSTOURETHROSCOPY,URETER CATHETER Left 5/6/2019    Procedure: CYSTOSCOPY RETROGRADE PYELOGRAM WITH INSERTION STENT URETERAL;  Surgeon: Layla Mendoza MD;  Location:  MAIN OR;  Service: Urology       History reviewed  No pertinent family history      Social History     Socioeconomic History    Marital status: Single     Spouse name: Not on file    Number of children: Not on file    Years of education: Not on file    Highest education level: Not on file Occupational History    Not on file   Social Needs    Financial resource strain: Not on file    Food insecurity:     Worry: Not on file     Inability: Not on file    Transportation needs:     Medical: Not on file     Non-medical: Not on file   Tobacco Use    Smoking status: Never Smoker    Smokeless tobacco: Never Used   Substance and Sexual Activity    Alcohol use: Not Currently     Frequency: Never     Binge frequency: Never    Drug use: Never    Sexual activity: Not on file   Lifestyle    Physical activity:     Days per week: Not on file     Minutes per session: Not on file    Stress: Not on file   Relationships    Social connections:     Talks on phone: Not on file     Gets together: Not on file     Attends Yazidism service: Not on file     Active member of club or organization: Not on file     Attends meetings of clubs or organizations: Not on file     Relationship status: Not on file    Intimate partner violence:     Fear of current or ex partner: Not on file     Emotionally abused: Not on file     Physically abused: Not on file     Forced sexual activity: Not on file   Other Topics Concern    Not on file   Social History Narrative    Not on file       No Known Allergies      Current Outpatient Medications:     docusate sodium (COLACE) 100 mg capsule, Take 1 capsule (100 mg total) by mouth 3 (three) times a day for 14 days, Disp: 90 capsule, Rfl: 0

## 2019-07-23 NOTE — PATIENT INSTRUCTIONS
Venas varicosas, cuidados ambulatorios   INFORMACIÓN GENERAL:   Las venas varicosas  son Mary Merry que se engrandecen, tuercen e hinchan  Las venas varicosas comúnmente aparecen en la parte trasera de renetta pantorrillas, rodillas y muslos  Síntomas comunes incluyen los siguientes:   · Venas azules, moradas o sobresalientes en renetta piernas    · Dolor, hinchazón o calambres musculares en renetta piernas    · Sentir pesadez en renetta piernas  Busque cuidados inmediatos para los siguientes síntomas:   · Usted tiene yumiko herida que no maria del rosario o está infectada  · Usted tiene yumiko lesión que le ha abierto la piel y le ha causado que renetta venas varicosas sangren  · Castro pierna está hinchada y dura  · Usted tiene dolor en castro pierna que no desaparece o empeora  · Usted se fija que renetta piernas o pies se están poniendo azulados o ennegrecidos  · Castro pierna se siente tibia al palpar, sensible y [place]  Puede que se jonas hinchada y enrojecida  El tratamiento para las venas varicosas  tiene ryan propósito disminuir síntomas, mejorar la apariencia y prevenir más problemas  El tratamiento dependerá de cuáles venas son afectadas y la severidad de castro condición en cada yumiko de renetta venas afectadas  Los medicamentos recetados para el dolor pueden darse  Pregunte cómo ashli frantz medicamento de yumiko forma donald  Los procedimientos pueden hacerse para quitar renetta venas varicosas  Es probable que castro proveedor de dianne le inyecte yumiko solución o use un láser para cerrar las venas varicosas  También es probable que se angelica yumiko cirugía para quitar las vengas varicosas largas  [place] a castro proveedor de [employer] procedimientos usados en el tratamiento de las venas varicosas  Maneje las venas varicosas:   · Use calcetines de compresión  Los calcetines son ajustados y aplican presión en renetta piernas  Mejoran el flujo sanguíneo y [facility] a prevenir coágulos sanguíneos             · Eleve  renetta piernas más arriba del nivel de castro corazón por 15 a 30 minutos varias veces al día  [place] ayuda a que la yamilet fluya de regreso hacia mitchell corazón  · Evite estar sentado o de pie por largos periodos de [place]  [place] puede causar que la yamilet se acumule en renetta piernas y que renetta síntomas empeoren  Camine por varios minutos cada hora para que la yamilet en renetta piernas se [place]  · Evite usar ropa y zapatos ajustados  Evite usar zapatos de [employer]  No use ropa que [place] alrededor de [employer]  · Obtenga suficiente ejercicio  Consulte con mitchell proveedor de dianne acerca del mejor plan de ejercicio para usted  El ejercicio puede disminuir la presión arterial y mejorar mitchell dianne  Doble o gire renetta tobillos varias veces cada hora  [place] ayudará a que la yamilet fluya hacia mitchell corazón  · Mantenga un peso saludable  Mitchell corazón funciona más forzosamente cuando usted está sobrepeso y [de-identified] puede hacer que las venas varicosas empeoren  Pregunte cuánto debe pesar usted  Pídale a mitchell proveedor de [employer] ayude a crear un plan de pérdida de peso si usted tiene sobrepeso  · No fume  Si usted fuma, nunca es muy tarde para dejar de fumar  Pida información si necesita ayuda para dejar de fumar  Programe yumiko keron con mitchell proveedor de [employer] se le haya indicado: Anote renetta preguntas para que se acuerde de hacerlas jeet renetta visitas  ACUERDOS SOBRE MITCHELL CUIDADO:   Usted tiene el derecho de participar en la planificación de mitchell cuidado  Aprenda todo lo que pueda sobre mitchell condición y ryan darle tratamiento  Discuta con renetta médicos renetta opciones de tratamiento para juntos decidir el cuidado que usted quiere recibir  Usted siempre tiene el derecho a rechazar mitchell tratamiento  Esta información es sólo para uso en educación  Mitchell intención no es darle un consejo médico sobre enfermedades o tratamientos  Colsulte con mitchell [employer]emi [employer] farmacéutico antes de seguir cualquier régimen médico para saber si es seguro y efectivo para usted    © 2014 [employer] [address] [place] [address] is for End User's use only and may not be sold, redistributed or otherwise used for commercial purposes  All illustrations and images included in CareNotes® are the copyrighted property of A D A M , Inc  or Alin Alvarado

## 2019-08-06 ENCOUNTER — HOSPITAL ENCOUNTER (OUTPATIENT)
Dept: RADIOLOGY | Facility: HOSPITAL | Age: 44
Discharge: HOME | End: 2019-08-06
Attending: UROLOGY
Payer: COMMERCIAL

## 2019-08-06 DIAGNOSIS — N20.0 NEPHROLITHIASIS: ICD-10-CM

## 2019-08-06 PROCEDURE — 74018 RADEX ABDOMEN 1 VIEW: CPT

## 2019-08-12 PROBLEM — N20.1 LEFT URETERAL CALCULUS: Status: RESOLVED | Noted: 2019-05-06 | Resolved: 2019-08-12

## 2019-08-12 NOTE — PROGRESS NOTES
8/13/2019      Chief Complaint   Patient presents with    Nephrolithiasis       Assessment and Plan    39 y o  male managed by Dr Otis Pompa    1  10 mm left ureteral calculus status post left ureteroscopy 6/3/19  - KUB with a punctate right sided calculi per my review, will await radiology read and call patient with any discrepancies   - discussed with the patient I do not believe his low back pain is secondary to calculi but we could obtain an ultrasound to confirm, he agrees  - recommend 60 ounces water daily and decreased amounts of coffee, tea, soda    Obtain US and will call with results     [id number] used      History of Present Verena is a 39 y o  male here for follow up evaluation of a 10mm left ureteral calculus status post left ureteroscopy 6/3/19  He presents today the patient presents today doing relatively well  He does state he gets low back pain after long periods of standing  Otherwise, no urinary complaints or signs and symptoms of obstructing calculi  Did have a KUB obtained prior to his visit  This is not yet read by Radiology  Per my review there is a punctate right-sided stone but the kidneys are somewhat obscured by bowel gas  Review of Systems   Constitutional: Negative for activity change, chills and fever  Gastrointestinal: Negative for abdominal distention and abdominal pain  Musculoskeletal: Negative for back pain and gait problem  Psychiatric/Behavioral: Negative for behavioral problems and confusion         Past Medical History  Past Medical History:   Diagnosis Date    Kidney stone     Urinary pain     Wears glasses        Past Social History  Past Surgical History:   Procedure Laterality Date    FL RETROGRADE PYELOGRAM  5/6/2019    FL RETROGRADE PYELOGRAM  6/3/2019    [place] CYSTO/URETERO W/LITHOTRIPSY &INDWELL STENT INSRT Left 6/3/2019    Procedure: CYSTO, URETEROSCOPY W/HOLMIUM LASER, RETROGRADE PYELOGRAM, STENT INSERTION; Surgeon: Koki Williamson MD;  [facility];  Service: Urology    [place] CYSTOURETHROSCOPY,URETER CATHETER Left 5/6/2019    Procedure: CYSTOSCOPY RETROGRADE PYELOGRAM WITH INSERTION STENT URETERAL;  Surgeon: Micaela Kothari MD;  Location:  MAIN OR;  Service: Urology     Social History     Tobacco Use   Smoking Status Never Smoker   Smokeless Tobacco Never Used       Past Family History  No family history on file      Past Social history  Social History     Socioeconomic History    Marital status: Single     Spouse name: Not on file    Number of children: Not on file    Years of education: Not on file    Highest education level: Not on file   Occupational History    Not on file   Social Needs    Financial resource strain: Not on file    Food insecurity:     Worry: Not on file     Inability: Not on file    Transportation needs:     Medical: Not on file     Non-medical: Not on file   Tobacco Use    Smoking status: Never Smoker    Smokeless tobacco: Never Used   Substance and Sexual Activity    Alcohol use: Not Currently     Frequency: Never     Binge frequency: Never    Drug use: Never    Sexual activity: Not on file   Lifestyle    Physical activity:     Days per week: Not on file     Minutes per session: Not on file    Stress: Not on file   Relationships    Social connections:     Talks on phone: Not on file     Gets together: Not on file     Attends Baptism service: Not on file     Active member of club or organization: Not on file     Attends meetings of clubs or organizations: Not on file     Relationship status: Not on file    Intimate partner violence:     Fear of current or ex partner: Not on file     Emotionally abused: Not on file     Physically abused: Not on file     Forced sexual activity: Not on file   Other Topics Concern    Not on file   Social History Narrative    Not on file       Current Medications  Current Outpatient Medications   Medication Sig Dispense Refill    docusate sodium (COLACE) 100 mg capsule Take 1 capsule (100 mg total) by mouth 3 (three) times a day for 14 days 90 capsule 0     No current facility-administered medications for this visit  Allergies  No Known Allergies      The following portions of the patient's history were reviewed and updated as appropriate: allergies, current medications, past medical history, past social history, past surgical history and problem list       Vitals  Vitals:    08/13/19 1000   BP: 116/70   Pulse: 70   Weight: 78 kg (172 lb)   Height: 5' 7" (1 702 m)           Physical Exam  Constitutional   General appearance: Patient is seated and in no acute distress, well appearing and well nourished  Head and Face   Head and face: Normal     Eyes   Conjunctiva and lids: No erythema, swelling or discharge  Ears, Nose, Mouth, and Throat   Hearing: Normal     Pulmonary   Respiratory effort: No increased work of breathing or signs of respiratory distress  Cardiovascular   Examination of extremities for edema and/or varicosities: Normal     Abdomen   Abdomen: Non-tender, no masses  Musculoskeletal   Gait and station: normal     Skin   Skin and subcutaneous tissue: Warm, dry, and intact  No visible lesions or rashes  Psychiatric   Judgment and insight: Normal  Recent and remote memory:  Normal  Mood and affect: Normal      Results  No results found for this or any previous visit (from the past 1 hour(s))  ]  No results found for: PSA  Lab Results   Component Value Date    CALCIUM 8 3 05/07/2019    K 4 3 05/07/2019    CO2 23 05/07/2019     (H) 05/07/2019    BUN 12 05/07/2019    CREATININE 0 90 05/07/2019     Lab Results   Component Value Date    WBC 7 79 05/07/2019    HGB 14 8 05/07/2019    HCT 43 3 05/07/2019    MCV 89 05/07/2019     05/07/2019       Orders  No orders of the defined types were placed in this encounter

## 2019-08-13 ENCOUNTER — OFFICE VISIT (OUTPATIENT)
Dept: UROLOGY | Facility: MEDICAL CENTER | Age: 44
End: 2019-08-13
Payer: COMMERCIAL

## 2019-08-13 VITALS
SYSTOLIC BLOOD PRESSURE: 116 MMHG | BODY MASS INDEX: 27 KG/M2 | WEIGHT: 172 LBS | HEIGHT: 67 IN | HEART RATE: 70 BPM | DIASTOLIC BLOOD PRESSURE: 70 MMHG

## 2019-08-13 DIAGNOSIS — N20.0 NEPHROLITHIASIS: Primary | ICD-10-CM

## 2019-08-13 PROCEDURE — 99213 OFFICE O/P EST LOW 20 MIN: CPT | Performed by: PHYSICIAN ASSISTANT

## 2019-08-20 ENCOUNTER — HOSPITAL ENCOUNTER (OUTPATIENT)
Dept: ULTRASOUND IMAGING | Facility: HOSPITAL | Age: 44
Discharge: HOME | End: 2019-08-20
Payer: COMMERCIAL

## 2019-08-20 DIAGNOSIS — N20.0 NEPHROLITHIASIS: ICD-10-CM

## 2019-08-20 PROCEDURE — 76770 US EXAM ABDO BACK WALL COMP: CPT

## 2019-08-22 ENCOUNTER — TELEPHONE (OUTPATIENT)
Dept: UROLOGY | Facility: CLINIC | Age: 44
End: 2019-08-22

## 2019-08-22 DIAGNOSIS — N20.0 NEPHROLITHIASIS: Primary | ICD-10-CM

## 2019-08-22 NOTE — TELEPHONE ENCOUNTER
Called patient through interpretor #[phone]    Patient did not answer  Message was left by interpretor in [place] for patient to return call

## 2019-08-22 NOTE — TELEPHONE ENCOUNTER
Ultrasound final and revealing a nonobstructing right-sided 7 mm stone in the lower pole  This is not the cause of his back pain  Recommend he follow up in 1 year with a KUB and ultrasound  Patient is Nepalese speaking

## 2019-08-26 NOTE — TELEPHONE ENCOUNTER
3rd message for patient to return call to discuss ultrasound results and recommendations  No communication consent found in chart, unable to leave detailed message

## 2019-09-12 ENCOUNTER — OFFICE VISIT (OUTPATIENT)
Dept: FAMILY MEDICINE CLINIC | Facility: CLINIC | Age: 44
End: 2019-09-12

## 2019-09-12 VITALS
BODY MASS INDEX: 27.25 KG/M2 | SYSTOLIC BLOOD PRESSURE: 140 MMHG | HEART RATE: 82 BPM | WEIGHT: 174 LBS | RESPIRATION RATE: 16 BRPM | TEMPERATURE: 98 F | DIASTOLIC BLOOD PRESSURE: 94 MMHG | OXYGEN SATURATION: 98 %

## 2019-09-12 DIAGNOSIS — R73.09 ELEVATED GLUCOSE LEVEL: ICD-10-CM

## 2019-09-12 DIAGNOSIS — N20.0 NEPHROLITHIASIS: Primary | ICD-10-CM

## 2019-09-12 DIAGNOSIS — R03.0 ELEVATED BLOOD PRESSURE READING: ICD-10-CM

## 2019-09-12 PROCEDURE — 99214 OFFICE O/P EST MOD 30 MIN: CPT | Performed by: FAMILY MEDICINE

## 2019-09-12 NOTE — ASSESSMENT & PLAN NOTE
Patient with glucose readings in may, 2019 ranging from 108-167  He has no history of diabetes  Encouraged patient to maintain a low sugar, low carbohydrate diet and exercise in order to prevent increase in sugar levels that could lead to diabetes  Ordered HbA1c for follow up during next visit and will continue to monitor  Patient agreed and all questions were answered

## 2019-09-12 NOTE — PROGRESS NOTES
Assessment/Plan:    Nephrolithiasis  Patient with history of nephrolithiasis of left kidney with ureteroscopy on June, 2019  Went to follow up with urology visit on august, 2019 where KUB was ordered showing right renal calculus stone and renal ultrasound was done where it was found a 7 mm non obstructing calculus stone of right kidney  Urology recommended follow up in 6 months  During today's visit, patient indicates presence of mild discomfort in right flank, denies dysuria or hematuria  On physical examination, No CVA noted on right flank  Encouraged patient to drink water to maintain adequate hydration and if symptoms worsens go to the ED for further evaluation  Patient agreed to recommendations and all questions were answered  Elevated blood pressure reading  Blood Pressure: 140/94     Patient indicates to have been stressed at work before coming to the appointment  Repeat BP manual: 130/90  Patient with no previous history of hypertension  Discussed with patient goal of BP of: <140/90 and the importance of maintaining a healthy lifestyle with low salt, low fat diet and exercise at least 30 minutes daily or as tolerated  Provided patient with healthy lifestyle recommendations and encourage patient to monitor blood pressure at home  Will follow up during next visit and if it presents elevated readings will consider adding blood pressure medications  Patient agreed to recommendations and all questions were answered  BMI 27 0-27 9,adult  BMI 27 2  Discussed with patient the importance of maintaining a healthy lifestyle with low salt, low fat diet and exercise  Provided patient with recommendations about healthy lifestyle  Will continue to follow up during next visit  Patient agreed to recommendations and all questions were answered  Elevated glucose level  Patient with glucose readings in may, 2019 ranging from 108-167  He has no history of diabetes   Encouraged patient to maintain a low sugar, low carbohydrate diet and exercise in order to prevent increase in sugar levels that could lead to diabetes  Ordered HbA1c for follow up during next visit and will continue to monitor  Patient agreed and all questions were answered  Diagnoses and all orders for this visit:    Nephrolithiasis    Elevated blood pressure reading  -     Comprehensive metabolic panel; Future  -     Lipid panel; Future    BMI 27 0-27 9,adult    Elevated glucose level  -     HEMOGLOBIN A1C W/ EAG ESTIMATION; Future          Subjective:      Patient ID: Melvin Rdz is a 39 y o  male  Case of 39year old male who came to clinic today for regular visit for evaluation of chronic problems  Patient indicates to have been following up with urology for history of nephrolithiasis where it was recently found a right kidney stone  Has been noticing right flank discomfort but no dysuria or hematuria  Denies chest pain, shortness of breath or any other additional symptoms noted  The following portions of the patient's history were reviewed and updated as appropriate: allergies, past family history, past social history and past surgical history  Review of Systems   Constitutional: Negative for activity change and appetite change  Respiratory: Negative for cough and shortness of breath  Cardiovascular: Negative for chest pain and palpitations  Gastrointestinal: Negative for abdominal pain  Genitourinary: Positive for flank pain ( right flank )  Negative for dysuria, frequency and hematuria  Musculoskeletal: Positive for back pain ( chronic)  Skin: Negative for color change, pallor and rash  Neurological: Negative for headaches           Objective:      /94 (BP Location: Left arm, Patient Position: Sitting, Cuff Size: Standard)   Pulse 82   Temp 98 °F (36 7 °C) (Temporal)   Resp 16   Wt 78 9 kg (174 lb)   SpO2 98%   BMI 27 25 kg/m²          Physical Exam   Constitutional: He is oriented to person, place, and time  He appears well-developed and well-nourished  No distress  Eyes: Conjunctivae and EOM are normal    Neck: Neck supple  Cardiovascular: Normal heart sounds  Pulmonary/Chest: Effort normal and breath sounds normal  No respiratory distress  Abdominal: Soft  He exhibits no distension  There is no tenderness  There is no guarding  Musculoskeletal:   Right flank discomfort, NO CVA tenderness noted   Neurological: He is alert and oriented to person, place, and time  Skin: Skin is warm  No rash noted  He is not diaphoretic  No erythema  Psychiatric: He has a normal mood and affect

## 2019-09-12 NOTE — ASSESSMENT & PLAN NOTE
Patient with history of nephrolithiasis of left kidney with ureteroscopy on June, 2019  Went to follow up with urology visit on august, 2019 where KUB was ordered showing right renal calculus stone and renal ultrasound was done where it was found a 7 mm non obstructing calculus stone of right kidney  Urology recommended follow up in 6 months  During today's visit, patient indicates presence of mild discomfort in right flank, denies dysuria or hematuria  On physical examination, No CVA noted on right flank  Encouraged patient to drink water to maintain adequate hydration and if symptoms worsens go to the ED for further evaluation  Patient agreed to recommendations and all questions were answered

## 2019-09-12 NOTE — PATIENT INSTRUCTIONS
Lifestyle Medicine Tip Sheet- Burmese    1  Coma alimentos predominantemente menos procesados, ryan comida rápida, cenas de televisión y tocino  2  Coma cerca de la naturaleza (mercados de agricultores, productos frescos o congelados)    3  Coma yumiko dieta predominantemente basada en plantas  a  Verdes frondosos oscuros john   b  Frutas y vegetales  c   Granos integrales: sue integral, apenas, bayas de sue, quinua, jani cortada en felipa, arroz integral, pasta integral   d  Legumbres: frijoles, frijoles pintos, frijoles blancos, frijoles negros, garbanzos (garbanzos), frijoles lima (maduros, secos), arvejas, lentejas y edamame (frijoles de soya verdes)      4  Al menos la mitad del plato debe contener frutas o verduras  5  El líquido debe ser predominantemente agua (limite el refresco y [place])    6  Namrata el tamaño de la porción  7  ¿Qué alimentos sharon evitar o limitar? - Grasas: Específicamente saturadas y grasas trans  Se encuentran en margarinas, muchas comidas rápidas y algunos productos horneados comprados en la jose  Las grasas saturadas y grasas trans pueden elevar castro nivel de colesterol y castro probabilidad de contraer enfermedades cardíacas  - Cuando cocine, es mejor no usar aceites, luis alberto si es necesario, trate de limitar la cantidad de aceite utilizado, ya que el aceite contiene muchas calorías por volumen y es muy poco saludable cuando se calienta jeet la cocción   - Azúcar: limite o evite el azúcar, los dulces y los granos refinados  Los granos refinados se encuentran en el pan aguillon, el arroz aguillon, la mayoría de las formas de pasta y la mayoría de los alimentos "aperitivos" envasados  - Intente no cocinar con ashleigh y evite agregar ashleigh adicional a renetta comidas  - Erik Curling: los estudios cardenas demostrado que comer mucha june [place] riesgo de ciertos problemas de [place], ryan enfermedades cardíacas y cáncer  8  7-9 horas de sueño    9   Ejercicio diario mínimo de 30 minutos (caminando alrededor de la kush)    10  Socialización (amigos y familiares)    - Explora tu vecindario  Ve al parque, pasa tiempo en la biblioteca  Si está interesado, puede leer más sobre las opciones de alimentos saludables en los siguientes sitios web:  a  [employer]  org  b  Home cooking recipes: https://www [place]/  c  http://elvia info/  d  Familydoctor  org

## 2019-09-12 NOTE — ASSESSMENT & PLAN NOTE
Blood Pressure: 140/94     Patient indicates to have been stressed at work before coming to the appointment  Repeat BP manual: 130/90  Patient with no previous history of hypertension  Discussed with patient goal of BP of: <140/90 and the importance of maintaining a healthy lifestyle with low salt, low fat diet and exercise at least 30 minutes daily or as tolerated  Provided patient with healthy lifestyle recommendations and encourage patient to monitor blood pressure at home  Will follow up during next visit and if it presents elevated readings will consider adding blood pressure medications  Patient agreed to recommendations and all questions were answered

## 2019-09-12 NOTE — ASSESSMENT & PLAN NOTE
BMI 27 2  Discussed with patient the importance of maintaining a healthy lifestyle with low salt, low fat diet and exercise  Provided patient with recommendations about healthy lifestyle  Will continue to follow up during next visit  Patient agreed to recommendations and all questions were answered

## 2019-09-19 ENCOUNTER — TELEPHONE (OUTPATIENT)
Dept: UROLOGY | Facility: CLINIC | Age: 44
End: 2019-09-19

## 2019-09-19 NOTE — TELEPHONE ENCOUNTER
Patient called to get update on his results  Relayed message that he needs to follow up within in year with [facility] and [facility]  He was at work and will call back to schedule  He asked if he should be seen in 6 months instead of a year  He stated if no answer please leave message and he will call back  Urdu speaking

## 2020-01-07 ENCOUNTER — APPOINTMENT (OUTPATIENT)
Dept: LAB | Facility: HOSPITAL | Age: 45
End: 2020-01-07

## 2020-01-07 DIAGNOSIS — R73.09 ELEVATED GLUCOSE LEVEL: ICD-10-CM

## 2020-01-07 DIAGNOSIS — R03.0 ELEVATED BLOOD PRESSURE READING: ICD-10-CM

## 2020-01-07 LAB
ALBUMIN SERPL BCP-MCNC: 4.6 G/DL (ref 3–5.2)
ALP SERPL-CCNC: 78 U/L (ref 43–122)
ALT SERPL W P-5'-P-CCNC: 22 U/L (ref 9–52)
ANION GAP SERPL CALCULATED.3IONS-SCNC: 9 MMOL/L (ref 5–14)
AST SERPL W P-5'-P-CCNC: 39 U/L (ref 17–59)
BILIRUB SERPL-MCNC: 0.6 MG/DL
BUN SERPL-MCNC: 11 MG/DL (ref 5–25)
CALCIUM SERPL-MCNC: 9.6 MG/DL (ref 8.4–10.2)
CHLORIDE SERPL-SCNC: 99 MMOL/L (ref 97–108)
CHOLEST SERPL-MCNC: 215 MG/DL
CO2 SERPL-SCNC: 30 MMOL/L (ref 22–30)
CREAT SERPL-MCNC: 0.88 MG/DL (ref 0.7–1.5)
EST. AVERAGE GLUCOSE BLD GHB EST-MCNC: 111 MG/DL
GFR SERPL CREATININE-BSD FRML MDRD: 120 ML/MIN/1.73SQ M
GLUCOSE P FAST SERPL-MCNC: 102 MG/DL (ref 70–99)
HBA1C MFR BLD: 5.5 % (ref 4.2–6.3)
HDLC SERPL-MCNC: 38 MG/DL
LDLC SERPL CALC-MCNC: 152 MG/DL
NONHDLC SERPL-MCNC: 177 MG/DL
POTASSIUM SERPL-SCNC: 4 MMOL/L (ref 3.6–5)
PROT SERPL-MCNC: 7.7 G/DL (ref 5.9–8.4)
SODIUM SERPL-SCNC: 138 MMOL/L (ref 137–147)
TRIGL SERPL-MCNC: 124 MG/DL

## 2020-01-07 PROCEDURE — 80061 LIPID PANEL: CPT

## 2020-01-07 PROCEDURE — 83036 HEMOGLOBIN GLYCOSYLATED A1C: CPT

## 2020-01-07 PROCEDURE — 80053 COMPREHEN METABOLIC PANEL: CPT

## 2020-01-07 PROCEDURE — 36415 COLL VENOUS BLD VENIPUNCTURE: CPT

## 2020-01-14 ENCOUNTER — OFFICE VISIT (OUTPATIENT)
Dept: FAMILY MEDICINE CLINIC | Facility: CLINIC | Age: 45
End: 2020-01-14

## 2020-01-14 VITALS
BODY MASS INDEX: 26.94 KG/M2 | OXYGEN SATURATION: 98 % | RESPIRATION RATE: 16 BRPM | WEIGHT: 172 LBS | SYSTOLIC BLOOD PRESSURE: 130 MMHG | DIASTOLIC BLOOD PRESSURE: 92 MMHG | TEMPERATURE: 98 F | HEART RATE: 94 BPM

## 2020-01-14 DIAGNOSIS — R73.09 ELEVATED GLUCOSE LEVEL: ICD-10-CM

## 2020-01-14 DIAGNOSIS — R03.0 ELEVATED BLOOD PRESSURE READING: Primary | ICD-10-CM

## 2020-01-14 PROCEDURE — 99213 OFFICE O/P EST LOW 20 MIN: CPT | Performed by: FAMILY MEDICINE

## 2020-01-14 NOTE — PATIENT INSTRUCTIONS
Lifestyle Medicine Tip Sheet- Nepalese    1  Coma alimentos predominantemente menos procesados, ryan comida rápida, cenas de televisión y tocino  2  Coma cerca de la naturaleza (mercados de agricultores, productos frescos o congelados)    3  Coma yumiko dieta predominantemente basada en plantas  a  Verdes frondosos oscuros john   b  Frutas y vegetales  c   Granos integrales: sue integral, apenas, bayas de sue, quinua, jani cortada en felipa, arroz integral, pasta integral   d  Legumbres: frijoles, frijoles pintos, frijoles blancos, frijoles negros, garbanzos (garbanzos), frijoles lima (maduros, secos), arvejas, lentejas y edamame (frijoles de soya verdes)      4  Al menos la mitad del plato debe contener frutas o verduras  5  El líquido debe ser predominantemente agua (limite el refresco y [place])    6  Namrata el tamaño de la porción  7  ¿Qué alimentos sharon evitar o limitar? - Grasas: Específicamente saturadas y grasas trans  Se encuentran en margarinas, muchas comidas rápidas y algunos productos horneados comprados en la jose  Las grasas saturadas y grasas trans pueden elevar castro nivel de colesterol y castro probabilidad de contraer enfermedades cardíacas  - Cuando cocine, es mejor no usar aceites, luis alberto si es necesario, trate de limitar la cantidad de aceite utilizado, ya que el aceite contiene muchas calorías por volumen y es muy poco saludable cuando se calienta jeet la cocción   - Azúcar: limite o evite el azúcar, los dulces y los granos refinados  Los granos refinados se encuentran en el pan aguillon, el arroz aguillon, la mayoría de las formas de pasta y la mayoría de los alimentos "aperitivos" envasados  - Intente no cocinar con ashleigh y evite agregar ashleigh adicional a renetta comidas  - Elisabeth Long: los estudios cardenas demostrado que comer mucha june [place] riesgo de ciertos problemas de [place], ryan enfermedades cardíacas y cáncer  8  7-9 horas de sueño    9   Ejercicio diario mínimo de 30 minutos (caminando alrededor de la kush)    10  Socialización (amigos y familiares)    - Explora tu vecindario  Ve al parque, pasa tiempo en la biblioteca  Si está interesado, puede leer más sobre las opciones de alimentos saludables en los siguientes sitios web:  a  [employer]  org  b  Home cooking recipes: https://www [employer]/  c  http://elvia info/  d  Family[employer]  org

## 2020-01-14 NOTE — ASSESSMENT & PLAN NOTE
Body mass index is 26 94 kg/m²  Patient indicates that has had some dietary indiscretions during the holidays  Encouraged the importance of maintaining a healthly lifestyle diet of low fat, low salt, low cholesterol, low carbohydrates as well as exercising at least 30 minutes at day and incorporating vegetables to the diet  Provided healthy lifestyle guide material and encouraged adherence to the plan  He verbalized understanding and all questions were answered

## 2020-01-14 NOTE — ASSESSMENT & PLAN NOTE
Lab Results   Component Value Date    HGBA1C 5 5 01/07/2020     Reviewed lab work with patient  HbA1c 5 5%, patient is not diabetic at the moment  Provided counseling on the importance of maintaining a low carbohydrate diet as well as exercising and will continue to follow up as needed

## 2020-01-14 NOTE — ASSESSMENT & PLAN NOTE
Blood Pressure: 130/92     Repeat BP manual: 130/90  Patient is currently approaching the hypertension range, due to which patient was encouraged to have strict adherence to low salt, low fat, low cholesterol diet as well as the importance of exercising at least 30 minutes at day and incorporating at least a cup of vegetables in his diet  Provided education and counseling regarding the importance to adherence to this recommendations in order to prevent increased risk of developing cardiovascular and diabetes disease among others  Will follow up during next visit and if blood pressure levels remain elevated will then consider starting blood pressure medications  Patient verbalized understanding and all questions were answered

## 2020-01-14 NOTE — PROGRESS NOTES
Assessment/Plan:    Elevated blood pressure reading  Blood Pressure: 130/92     Repeat BP manual: 130/90  Patient is currently approaching the hypertension range, due to which patient was encouraged to have strict adherence to low salt, low fat, low cholesterol diet as well as the importance of exercising at least 30 minutes at day and incorporating at least a cup of vegetables in his diet  Provided education and counseling regarding the importance to adherence to this recommendations in order to prevent increased risk of developing cardiovascular and diabetes disease among others  Will follow up during next visit and if blood pressure levels remain elevated will then consider starting blood pressure medications  Patient verbalized understanding and all questions were answered  BMI 27 0-27 9,adult  Body mass index is 26 94 kg/m²  Patient indicates that has had some dietary indiscretions during the holidays  Encouraged the importance of maintaining a healthly lifestyle diet of low fat, low salt, low cholesterol, low carbohydrates as well as exercising at least 30 minutes at day and incorporating vegetables to the diet  Provided healthy lifestyle guide material and encouraged adherence to the plan  He verbalized understanding and all questions were answered  Elevated glucose level  Lab Results   Component Value Date    HGBA1C 5 5 01/07/2020     Reviewed lab work with patient  HbA1c 5 5%, patient is not diabetic at the moment  Provided counseling on the importance of maintaining a low carbohydrate diet as well as exercising and will continue to follow up as needed  Diagnoses and all orders for this visit:    BMI 26 0-26 9,adult          Subjective:      Patient ID: Luke Abernathy is a 38 y o  male  Case of 38year old male who came to the clinic for follow up visit  He had lab work done and results were discussed and reviewed during today's visit   He indicates that during the holidays has not been following a diet but otherwise is feeling great and has had no new symptoms since previous visit  The following portions of the patient's history were reviewed and updated as appropriate: allergies, past family history, past social history and past surgical history  Review of Systems   Constitutional: Negative for activity change and appetite change  Eyes: Negative for visual disturbance  Respiratory: Negative for cough and shortness of breath  Cardiovascular: Negative for chest pain, palpitations and leg swelling  Gastrointestinal: Negative for abdominal pain, diarrhea, nausea and vomiting  Musculoskeletal: Negative for arthralgias and back pain  Skin: Negative for color change, pallor, rash and wound  Neurological: Negative for headaches  Objective:      /92 (BP Location: Left arm, Patient Position: Sitting, Cuff Size: Large)   Pulse 94   Temp 98 °F (36 7 °C) (Temporal)   Resp 16   Wt 78 kg (172 lb)   SpO2 98%   BMI 26 94 kg/m²          Physical Exam   Constitutional: He is oriented to person, place, and time  He appears well-developed and well-nourished  No distress  HENT:   Head: Atraumatic  Eyes: EOM are normal  No scleral icterus  Neck: Neck supple  Cardiovascular: Normal rate and normal heart sounds  No murmur heard  Pulmonary/Chest: Effort normal and breath sounds normal  No respiratory distress  Abdominal: Soft  Bowel sounds are normal  He exhibits no distension  There is no tenderness  Musculoskeletal: Normal range of motion  He exhibits no edema, tenderness or deformity  Neurological: He is alert and oriented to person, place, and time  Skin: Skin is warm  No rash noted  He is not diaphoretic  No erythema  No pallor  Psychiatric: He has a normal mood and affect  BMI Counseling: Body mass index is 26 94 kg/m²   The BMI is above normal  Nutrition recommendations include reducing portion sizes, decreasing overall calorie intake, 3-5 servings of fruits/vegetables daily, reducing fast food intake, consuming healthier snacks, decreasing soda and/or juice intake, moderation in carbohydrate intake, increasing intake of lean protein, reducing intake of saturated fat and trans fat and reducing intake of cholesterol

## 2020-03-17 ENCOUNTER — HOSPITAL ENCOUNTER (OUTPATIENT)
Dept: NON INVASIVE DIAGNOSTICS | Facility: CLINIC | Age: 45
Discharge: HOME | End: 2020-03-17
Payer: COMMERCIAL

## 2020-03-17 ENCOUNTER — TELEPHONE (OUTPATIENT)
Dept: VASCULAR SURGERY | Facility: CLINIC | Age: 45
End: 2020-03-17

## 2020-03-17 DIAGNOSIS — I83.12 VARICOSE VEINS OF BOTH LOWER EXTREMITIES WITH INFLAMMATION: ICD-10-CM

## 2020-03-17 DIAGNOSIS — I83.11 VARICOSE VEINS OF BOTH LOWER EXTREMITIES WITH INFLAMMATION: ICD-10-CM

## 2020-03-17 PROCEDURE — 93970 EXTREMITY STUDY: CPT | Performed by: SURGERY

## 2020-03-17 PROCEDURE — 93970 EXTREMITY STUDY: CPT

## 2020-04-21 ENCOUNTER — TELEMEDICINE (OUTPATIENT)
Dept: VASCULAR SURGERY | Facility: CLINIC | Age: 45
End: 2020-04-21
Payer: COMMERCIAL

## 2020-04-21 VITALS — HEIGHT: 67 IN | WEIGHT: 165 LBS | BODY MASS INDEX: 25.9 KG/M2

## 2020-04-21 DIAGNOSIS — I83.12 VARICOSE VEINS OF BOTH LOWER EXTREMITIES WITH INFLAMMATION: Primary | ICD-10-CM

## 2020-04-21 DIAGNOSIS — I83.11 VARICOSE VEINS OF BOTH LOWER EXTREMITIES WITH INFLAMMATION: Primary | ICD-10-CM

## 2020-04-21 PROCEDURE — 99213 OFFICE O/P EST LOW 20 MIN: CPT | Performed by: SURGERY

## 2020-04-29 ENCOUNTER — APPOINTMENT (EMERGENCY)
Dept: MRI IMAGING | Facility: HOSPITAL | Age: 45
End: 2020-04-29
Payer: COMMERCIAL

## 2020-04-29 ENCOUNTER — HOSPITAL ENCOUNTER (EMERGENCY)
Facility: HOSPITAL | Age: 45
Discharge: HOME | End: 2020-04-29
Attending: EMERGENCY MEDICINE | Admitting: EMERGENCY MEDICINE
Payer: COMMERCIAL

## 2020-04-29 ENCOUNTER — TELEMEDICINE (OUTPATIENT)
Dept: FAMILY MEDICINE CLINIC | Facility: CLINIC | Age: 45
End: 2020-04-29

## 2020-04-29 VITALS
SYSTOLIC BLOOD PRESSURE: 156 MMHG | HEART RATE: 87 BPM | OXYGEN SATURATION: 100 % | RESPIRATION RATE: 16 BRPM | TEMPERATURE: 97.5 F | DIASTOLIC BLOOD PRESSURE: 107 MMHG | BODY MASS INDEX: 27 KG/M2 | WEIGHT: 172.4 LBS

## 2020-04-29 DIAGNOSIS — I10 HTN (HYPERTENSION): ICD-10-CM

## 2020-04-29 DIAGNOSIS — R51.9 ACUTE INTRACTABLE HEADACHE, UNSPECIFIED HEADACHE TYPE: Primary | ICD-10-CM

## 2020-04-29 DIAGNOSIS — R51.9 HEADACHE: Primary | ICD-10-CM

## 2020-04-29 LAB
ALBUMIN SERPL BCP-MCNC: 4.2 G/DL (ref 3–5.2)
ALP SERPL-CCNC: 88 U/L (ref 43–122)
ALT SERPL W P-5'-P-CCNC: 23 U/L (ref 9–52)
ANION GAP SERPL CALCULATED.3IONS-SCNC: 7 MMOL/L (ref 5–14)
APTT PPP: 28 SECONDS (ref 23–37)
AST SERPL W P-5'-P-CCNC: 29 U/L (ref 17–59)
ATRIAL RATE: 92 BPM
BASOPHILS # BLD AUTO: 0 THOUSANDS/ΜL (ref 0–0.1)
BASOPHILS NFR BLD AUTO: 1 % (ref 0–1)
BILIRUB SERPL-MCNC: 0.2 MG/DL
BUN SERPL-MCNC: 16 MG/DL (ref 5–25)
CALCIUM SERPL-MCNC: 9.4 MG/DL (ref 8.4–10.2)
CHLORIDE SERPL-SCNC: 103 MMOL/L (ref 97–108)
CO2 SERPL-SCNC: 29 MMOL/L (ref 22–30)
CREAT SERPL-MCNC: 1.11 MG/DL (ref 0.7–1.5)
EOSINOPHIL # BLD AUTO: 0.1 THOUSAND/ΜL (ref 0–0.4)
EOSINOPHIL NFR BLD AUTO: 2 % (ref 0–6)
ERYTHROCYTE [DISTWIDTH] IN BLOOD BY AUTOMATED COUNT: 12.8 %
ERYTHROCYTE [SEDIMENTATION RATE] IN BLOOD: 10 MM/HOUR (ref 1–20)
GFR SERPL CREATININE-BSD FRML MDRD: 80 ML/MIN/1.73SQ M
GLUCOSE SERPL-MCNC: 91 MG/DL (ref 70–99)
HCT VFR BLD AUTO: 45 % (ref 41–53)
HGB BLD-MCNC: 15.5 G/DL (ref 13.5–17.5)
INR PPP: 1.13 (ref 0.84–1.19)
LYMPHOCYTES # BLD AUTO: 2.5 THOUSANDS/ΜL (ref 0.5–4)
LYMPHOCYTES NFR BLD AUTO: 33 % (ref 25–45)
MCH RBC QN AUTO: 30.8 PG (ref 26–34)
MCHC RBC AUTO-ENTMCNC: 34.4 G/DL (ref 31–36)
MCV RBC AUTO: 90 FL (ref 80–100)
MONOCYTES # BLD AUTO: 0.5 THOUSAND/ΜL (ref 0.2–0.9)
MONOCYTES NFR BLD AUTO: 7 % (ref 1–10)
NEUTROPHILS # BLD AUTO: 4.3 THOUSANDS/ΜL (ref 1.8–7.8)
NEUTS SEG NFR BLD AUTO: 57 % (ref 45–65)
P AXIS: 43 DEGREES
PLATELET # BLD AUTO: 231 THOUSANDS/UL (ref 150–450)
PMV BLD AUTO: 8.1 FL (ref 8.9–12.7)
POTASSIUM SERPL-SCNC: 4 MMOL/L (ref 3.6–5)
PR INTERVAL: 150 MS
PROT SERPL-MCNC: 7.2 G/DL (ref 5.9–8.4)
PROTHROMBIN TIME: 13.9 SECONDS (ref 11.6–14.5)
QRS AXIS: -3 DEGREES
QRSD INTERVAL: 76 MS
QT INTERVAL: 328 MS
QTC INTERVAL: 405 MS
RBC # BLD AUTO: 5.01 MILLION/UL (ref 4.5–5.9)
SODIUM SERPL-SCNC: 139 MMOL/L (ref 137–147)
T WAVE AXIS: 22 DEGREES
VENTRICULAR RATE: 92 BPM
WBC # BLD AUTO: 7.4 THOUSAND/UL (ref 4.5–11)

## 2020-04-29 PROCEDURE — 93005 ELECTROCARDIOGRAM TRACING: CPT

## 2020-04-29 PROCEDURE — 70547 MR ANGIOGRAPHY NECK W/O DYE: CPT

## 2020-04-29 PROCEDURE — 85610 PROTHROMBIN TIME: CPT | Performed by: EMERGENCY MEDICINE

## 2020-04-29 PROCEDURE — 85025 COMPLETE CBC W/AUTO DIFF WBC: CPT | Performed by: EMERGENCY MEDICINE

## 2020-04-29 PROCEDURE — 99284 EMERGENCY DEPT VISIT MOD MDM: CPT | Performed by: EMERGENCY MEDICINE

## 2020-04-29 PROCEDURE — 80053 COMPREHEN METABOLIC PANEL: CPT | Performed by: EMERGENCY MEDICINE

## 2020-04-29 PROCEDURE — 99213 OFFICE O/P EST LOW 20 MIN: CPT | Performed by: NURSE PRACTITIONER

## 2020-04-29 PROCEDURE — 93010 ELECTROCARDIOGRAM REPORT: CPT | Performed by: INTERNAL MEDICINE

## 2020-04-29 PROCEDURE — 85730 THROMBOPLASTIN TIME PARTIAL: CPT | Performed by: EMERGENCY MEDICINE

## 2020-04-29 PROCEDURE — 99284 EMERGENCY DEPT VISIT MOD MDM: CPT

## 2020-04-29 PROCEDURE — 36415 COLL VENOUS BLD VENIPUNCTURE: CPT | Performed by: EMERGENCY MEDICINE

## 2020-04-29 PROCEDURE — 70551 MRI BRAIN STEM W/O DYE: CPT

## 2020-04-29 PROCEDURE — 85652 RBC SED RATE AUTOMATED: CPT | Performed by: EMERGENCY MEDICINE

## 2020-04-29 RX ORDER — VERAPAMIL HYDROCHLORIDE 40 MG/1
40 TABLET ORAL DAILY
Qty: 30 TABLET | Refills: 0 | Status: SHIPPED | OUTPATIENT
Start: 2020-04-29 | End: 2020-05-29

## 2020-05-05 ENCOUNTER — OFFICE VISIT (OUTPATIENT)
Dept: FAMILY MEDICINE CLINIC | Facility: CLINIC | Age: 45
End: 2020-05-05

## 2020-05-05 VITALS
HEART RATE: 86 BPM | OXYGEN SATURATION: 98 % | HEIGHT: 67 IN | DIASTOLIC BLOOD PRESSURE: 88 MMHG | SYSTOLIC BLOOD PRESSURE: 138 MMHG | TEMPERATURE: 98.1 F | WEIGHT: 171.6 LBS | BODY MASS INDEX: 26.93 KG/M2 | RESPIRATION RATE: 18 BRPM

## 2020-05-05 DIAGNOSIS — G43.919 INTRACTABLE MIGRAINE WITHOUT STATUS MIGRAINOSUS, UNSPECIFIED MIGRAINE TYPE: Primary | ICD-10-CM

## 2020-05-05 DIAGNOSIS — R03.0 ELEVATED BLOOD PRESSURE READING: ICD-10-CM

## 2020-05-05 DIAGNOSIS — G43.009 MIGRAINE WITHOUT AURA AND WITHOUT STATUS MIGRAINOSUS, NOT INTRACTABLE: ICD-10-CM

## 2020-05-05 PROCEDURE — 99214 OFFICE O/P EST MOD 30 MIN: CPT | Performed by: FAMILY MEDICINE

## 2020-05-05 PROCEDURE — 3008F BODY MASS INDEX DOCD: CPT | Performed by: FAMILY MEDICINE

## 2020-05-05 PROCEDURE — 3075F SYST BP GE 130 - 139MM HG: CPT | Performed by: FAMILY MEDICINE

## 2020-05-05 PROCEDURE — 1036F TOBACCO NON-USER: CPT | Performed by: FAMILY MEDICINE

## 2020-05-05 PROCEDURE — 3079F DIAST BP 80-89 MM HG: CPT | Performed by: FAMILY MEDICINE

## 2020-05-05 RX ORDER — ADHESIVE BANDAGE 3/4"
BANDAGE TOPICAL 2 TIMES DAILY
Qty: 1 EACH | Refills: 0 | Status: SHIPPED | OUTPATIENT
Start: 2020-05-05

## 2020-05-05 RX ORDER — AMITRIPTYLINE HYDROCHLORIDE 10 MG/1
10 TABLET, FILM COATED ORAL
Qty: 30 TABLET | Refills: 0 | Status: SHIPPED | OUTPATIENT
Start: 2020-05-05 | End: 2020-06-09 | Stop reason: SDUPTHER

## 2020-05-05 RX ORDER — IBUPROFEN 200 MG
400 TABLET ORAL EVERY 4 HOURS PRN
Qty: 60 TABLET | Refills: 2 | Status: SHIPPED | OUTPATIENT
Start: 2020-05-05 | End: 2020-06-04

## 2020-05-05 RX ORDER — AMITRIPTYLINE HYDROCHLORIDE 10 MG/1
10 TABLET, FILM COATED ORAL
Qty: 30 TABLET | Refills: 0 | Status: SHIPPED | OUTPATIENT
Start: 2020-05-05 | End: 2020-05-05

## 2020-05-05 RX ORDER — IBUPROFEN 200 MG
400 TABLET ORAL EVERY 4 HOURS PRN
Qty: 60 TABLET | Refills: 2 | Status: SHIPPED | OUTPATIENT
Start: 2020-05-05 | End: 2020-05-05

## 2020-05-06 ENCOUNTER — TELEPHONE (OUTPATIENT)
Dept: FAMILY MEDICINE CLINIC | Facility: CLINIC | Age: 45
End: 2020-05-06

## 2020-06-05 DIAGNOSIS — R51.9 HEADACHE: ICD-10-CM

## 2020-06-05 DIAGNOSIS — I10 HTN (HYPERTENSION): ICD-10-CM

## 2020-06-05 RX ORDER — VERAPAMIL HYDROCHLORIDE 40 MG/1
40 TABLET ORAL DAILY
Qty: 30 TABLET | Refills: 0 | OUTPATIENT
Start: 2020-06-05 | End: 2020-07-05

## 2020-06-09 ENCOUNTER — OFFICE VISIT (OUTPATIENT)
Dept: FAMILY MEDICINE CLINIC | Facility: CLINIC | Age: 45
End: 2020-06-09

## 2020-06-09 VITALS
OXYGEN SATURATION: 98 % | HEART RATE: 101 BPM | HEIGHT: 67 IN | RESPIRATION RATE: 18 BRPM | TEMPERATURE: 97.6 F | WEIGHT: 174 LBS | BODY MASS INDEX: 27.31 KG/M2 | SYSTOLIC BLOOD PRESSURE: 142 MMHG | DIASTOLIC BLOOD PRESSURE: 90 MMHG

## 2020-06-09 DIAGNOSIS — G43.919 INTRACTABLE MIGRAINE WITHOUT STATUS MIGRAINOSUS, UNSPECIFIED MIGRAINE TYPE: ICD-10-CM

## 2020-06-09 DIAGNOSIS — R03.0 ELEVATED BLOOD PRESSURE READING: Primary | ICD-10-CM

## 2020-06-09 PROCEDURE — 3080F DIAST BP >= 90 MM HG: CPT | Performed by: FAMILY MEDICINE

## 2020-06-09 PROCEDURE — 1036F TOBACCO NON-USER: CPT | Performed by: FAMILY MEDICINE

## 2020-06-09 PROCEDURE — 3077F SYST BP >= 140 MM HG: CPT | Performed by: FAMILY MEDICINE

## 2020-06-09 PROCEDURE — 3008F BODY MASS INDEX DOCD: CPT | Performed by: FAMILY MEDICINE

## 2020-06-09 PROCEDURE — 99213 OFFICE O/P EST LOW 20 MIN: CPT | Performed by: FAMILY MEDICINE

## 2020-06-09 RX ORDER — AMITRIPTYLINE HYDROCHLORIDE 10 MG/1
10 TABLET, FILM COATED ORAL
Qty: 60 TABLET | Refills: 0 | Status: SHIPPED | OUTPATIENT
Start: 2020-06-09

## 2020-06-23 ENCOUNTER — OFFICE VISIT (OUTPATIENT)
Dept: FAMILY MEDICINE CLINIC | Facility: CLINIC | Age: 45
End: 2020-06-23

## 2020-06-23 VITALS
HEART RATE: 87 BPM | TEMPERATURE: 97.6 F | OXYGEN SATURATION: 98 % | DIASTOLIC BLOOD PRESSURE: 90 MMHG | BODY MASS INDEX: 27.15 KG/M2 | HEIGHT: 67 IN | SYSTOLIC BLOOD PRESSURE: 148 MMHG | RESPIRATION RATE: 18 BRPM | WEIGHT: 173 LBS

## 2020-06-23 DIAGNOSIS — I10 ESSENTIAL HYPERTENSION: Primary | ICD-10-CM

## 2020-06-23 DIAGNOSIS — N20.0 NEPHROLITHIASIS: ICD-10-CM

## 2020-06-23 DIAGNOSIS — G43.919 INTRACTABLE MIGRAINE WITHOUT STATUS MIGRAINOSUS, UNSPECIFIED MIGRAINE TYPE: ICD-10-CM

## 2020-06-23 DIAGNOSIS — R03.0 ELEVATED BLOOD PRESSURE READING: ICD-10-CM

## 2020-06-23 PROCEDURE — 3008F BODY MASS INDEX DOCD: CPT | Performed by: FAMILY MEDICINE

## 2020-06-23 PROCEDURE — 3080F DIAST BP >= 90 MM HG: CPT | Performed by: FAMILY MEDICINE

## 2020-06-23 PROCEDURE — 99213 OFFICE O/P EST LOW 20 MIN: CPT | Performed by: FAMILY MEDICINE

## 2020-06-23 PROCEDURE — 1036F TOBACCO NON-USER: CPT | Performed by: FAMILY MEDICINE

## 2020-06-23 PROCEDURE — 3077F SYST BP >= 140 MM HG: CPT | Performed by: FAMILY MEDICINE

## 2021-10-01 ENCOUNTER — HOSPITAL ENCOUNTER (OUTPATIENT)
Dept: RADIOLOGY | Facility: HOSPITAL | Age: 46
Discharge: HOME | End: 2021-10-01
Attending: PODIATRIST

## 2021-10-01 DIAGNOSIS — M79.671 PAIN IN RIGHT FOOT: ICD-10-CM

## 2021-10-01 PROCEDURE — 73630 X-RAY EXAM OF FOOT: CPT

## 2022-08-12 ENCOUNTER — APPOINTMENT (OUTPATIENT)
Dept: LAB | Facility: CLINIC | Age: 47
End: 2022-08-12
Payer: COMMERCIAL

## 2022-08-12 ENCOUNTER — OFFICE VISIT (OUTPATIENT)
Dept: FAMILY MEDICINE CLINIC | Facility: CLINIC | Age: 47
End: 2022-08-12

## 2022-08-12 VITALS
HEART RATE: 94 BPM | OXYGEN SATURATION: 96 % | DIASTOLIC BLOOD PRESSURE: 104 MMHG | RESPIRATION RATE: 18 BRPM | WEIGHT: 174 LBS | TEMPERATURE: 98.5 F | SYSTOLIC BLOOD PRESSURE: 158 MMHG | BODY MASS INDEX: 27.25 KG/M2

## 2022-08-12 DIAGNOSIS — R03.0 ELEVATED BLOOD PRESSURE READING: ICD-10-CM

## 2022-08-12 DIAGNOSIS — Z11.4 SCREENING FOR HIV (HUMAN IMMUNODEFICIENCY VIRUS): ICD-10-CM

## 2022-08-12 DIAGNOSIS — Z11.59 NEED FOR HEPATITIS C SCREENING TEST: ICD-10-CM

## 2022-08-12 DIAGNOSIS — Z11.59 NEED FOR HEPATITIS C SCREENING TEST: Primary | ICD-10-CM

## 2022-08-12 DIAGNOSIS — N20.0 NEPHROLITHIASIS: ICD-10-CM

## 2022-08-12 LAB
ALBUMIN SERPL BCP-MCNC: 3.8 G/DL (ref 3.5–5)
ALP SERPL-CCNC: 91 U/L (ref 46–116)
ALT SERPL W P-5'-P-CCNC: 19 U/L (ref 12–78)
ANION GAP SERPL CALCULATED.3IONS-SCNC: 5 MMOL/L (ref 4–13)
AST SERPL W P-5'-P-CCNC: 25 U/L (ref 5–45)
BASOPHILS # BLD AUTO: 0.04 THOUSANDS/ΜL (ref 0–0.1)
BASOPHILS NFR BLD AUTO: 1 % (ref 0–1)
BILIRUB SERPL-MCNC: 0.51 MG/DL (ref 0.2–1)
BUN SERPL-MCNC: 12 MG/DL (ref 5–25)
CALCIUM SERPL-MCNC: 9 MG/DL (ref 8.3–10.1)
CHLORIDE SERPL-SCNC: 106 MMOL/L (ref 96–108)
CHOLEST SERPL-MCNC: 193 MG/DL
CO2 SERPL-SCNC: 27 MMOL/L (ref 21–32)
CREAT SERPL-MCNC: 0.98 MG/DL (ref 0.6–1.3)
EOSINOPHIL # BLD AUTO: 0.06 THOUSAND/ΜL (ref 0–0.61)
EOSINOPHIL NFR BLD AUTO: 1 % (ref 0–6)
ERYTHROCYTE [DISTWIDTH] IN BLOOD BY AUTOMATED COUNT: 12.5 % (ref 11.6–15.1)
EST. AVERAGE GLUCOSE BLD GHB EST-MCNC: 114 MG/DL
GFR SERPL CREATININE-BSD FRML MDRD: 91 ML/MIN/1.73SQ M
GLUCOSE P FAST SERPL-MCNC: 91 MG/DL (ref 65–99)
HBA1C MFR BLD: 5.6 %
HCT VFR BLD AUTO: 45.4 % (ref 36.5–49.3)
HCV AB SER QL: NORMAL
HDLC SERPL-MCNC: 41 MG/DL
HGB BLD-MCNC: 14.9 G/DL (ref 12–17)
IMM GRANULOCYTES # BLD AUTO: 0.01 THOUSAND/UL (ref 0–0.2)
IMM GRANULOCYTES NFR BLD AUTO: 0 % (ref 0–2)
LDLC SERPL CALC-MCNC: 122 MG/DL (ref 0–100)
LYMPHOCYTES # BLD AUTO: 2.08 THOUSANDS/ΜL (ref 0.6–4.47)
LYMPHOCYTES NFR BLD AUTO: 35 % (ref 14–44)
MCH RBC QN AUTO: 29.8 PG (ref 26.8–34.3)
MCHC RBC AUTO-ENTMCNC: 32.8 G/DL (ref 31.4–37.4)
MCV RBC AUTO: 91 FL (ref 82–98)
MONOCYTES # BLD AUTO: 0.51 THOUSAND/ΜL (ref 0.17–1.22)
MONOCYTES NFR BLD AUTO: 9 % (ref 4–12)
NEUTROPHILS # BLD AUTO: 3.25 THOUSANDS/ΜL (ref 1.85–7.62)
NEUTS SEG NFR BLD AUTO: 54 % (ref 43–75)
NONHDLC SERPL-MCNC: 152 MG/DL
NRBC BLD AUTO-RTO: 0 /100 WBCS
PLATELET # BLD AUTO: 261 THOUSANDS/UL (ref 149–390)
PMV BLD AUTO: 10.4 FL (ref 8.9–12.7)
POTASSIUM SERPL-SCNC: 3.9 MMOL/L (ref 3.5–5.3)
PROT SERPL-MCNC: 7.6 G/DL (ref 6.4–8.4)
RBC # BLD AUTO: 5 MILLION/UL (ref 3.88–5.62)
SODIUM SERPL-SCNC: 138 MMOL/L (ref 135–147)
TRIGL SERPL-MCNC: 152 MG/DL
TSH SERPL DL<=0.05 MIU/L-ACNC: 0.75 UIU/ML (ref 0.45–4.5)
WBC # BLD AUTO: 5.95 THOUSAND/UL (ref 4.31–10.16)

## 2022-08-12 PROCEDURE — 86803 HEPATITIS C AB TEST: CPT

## 2022-08-12 PROCEDURE — 36415 COLL VENOUS BLD VENIPUNCTURE: CPT

## 2022-08-12 PROCEDURE — 80053 COMPREHEN METABOLIC PANEL: CPT

## 2022-08-12 PROCEDURE — 85025 COMPLETE CBC W/AUTO DIFF WBC: CPT

## 2022-08-12 PROCEDURE — 87389 HIV-1 AG W/HIV-1&-2 AB AG IA: CPT

## 2022-08-12 PROCEDURE — 99213 OFFICE O/P EST LOW 20 MIN: CPT | Performed by: FAMILY MEDICINE

## 2022-08-12 PROCEDURE — 80061 LIPID PANEL: CPT

## 2022-08-12 PROCEDURE — 84443 ASSAY THYROID STIM HORMONE: CPT

## 2022-08-12 PROCEDURE — 83036 HEMOGLOBIN GLYCOSYLATED A1C: CPT

## 2022-08-12 NOTE — ASSESSMENT & PLAN NOTE
158/104 uncontrolled  continously elevated from chart review with pressures as great as the 180s over 110  Discussed in depth with patient the risks of stroke/cardiovascular complications as he remains reluctant to commence treatment for an unknown reason to himself  BP machine at home but   Decision was made to complete a 24 hour blood pressure recording and if pressures remained elevated Runell Pencil is agreeable to commence treatment      - labs cbc, cmp, lipids  - 24hr monitor for BP control  - DASH diet explained   - follow-up with results of BP monitor for consideration of treatment

## 2022-08-12 NOTE — ASSESSMENT & PLAN NOTE
History of 7mm nonobstructive calculus at the lower pole of the right kidney in 2019 as per imaging  Was previously seen by Urology & was recommended to f/up in 1 year which pt declined opting for 2nd opinion in the [place]  This has not been performed patient would not like a referral   Asymptomatic at present though endorses intermittent right>left loin to groin type pains, most recent a few months back  No fever, urinary symptoms, hematuria      - Will refer to Urology and defer further imaging at this time

## 2022-08-12 NOTE — PATIENT INSTRUCTIONS
Plan de alimentación con "enfoque dietético para detener la hipertensión (DASH, por renetta siglas en inglés)   LO QUE NECESITA SABER:   El plan de alimentación DASH está diseñado para ayudar a prevenir o disminuir la hipertensión  También puede ayudar a bajar el colesterol nikolai (colesterol LDL) y disminuir castro riesgo de enfermedad cardíaca  El plan es bajo en sodio, azúcar, grasas dañinas, y grasas en castro totalidad  Es alto en potasio, calcio, magnesio y [facility]  Estos nutrientes se agregan al consumir más frutas, vegetales y granos enteros  Con el plan de alimentación DASH, usted necesita consumir un número específico de porciones de cada junaid de alimentos  Catharine le ayudará a consumir las cantidades suficientes de ciertos nutrientes y limitar otros  La cantidad de porciones que usted debe comer depende de la cantidad de calorías que usted necesita  Castro dietista puede ayudarlo a crear planes de comidas con la cantidad [place] de porciones para cada junaid de alimentos  INSTRUCCIONES SOBRE EL RADHA HOSPITALARIA:   Lo que necesita saber acerca del sodio: Castro dietista le indicará la cantidad de sodio que usted debe consumir a diario  La gente que tiene la presión arterial radha debe consumir de 1,500 a 2,300 mg de sodio al día ryan emil  Yumiko cucharadita (cdta) de sal tiene 2,300 mg de sodio  Catharine puede parecer ryan yumiko meta difícil, luis alberto pequeños cambios en los alimentos que usted consume pueden hacer yumiko gran diferencia  Castro médico o dietista puede ayudarlo a crear un plan alimenticio que cumpla castro límite de sodio  Rowan las etiquetas de los alimentos  Las etiquetas pueden ayudarle a escoger alimentos bajos en sodio  La cantidad de sodio está incluida en miligramos (mg)  La columna del porcentaje de valor diario indica la cantidad de necesidades diarias satisfechas con 1 porción del alimento para cada nutriente en la lista  Escoja alimentos que tengan menos de 5% del porcentaje diario de [place]   Estos alimentos se consideran bajos en sodio  Los alimentos que tienen 20% o más del porcentaje diario de sodio se consideran alimentos altos en sodio  Evite alimentos que tengan más de 300 mg de sodio por porción  Escoja alimentos Hauser Massy diga que son bajos en sodio, con sodio reducido, o sin sal agregada  Limite la sal agregada  No sale la comida en la oshea si se añade sal al cocinar  Use hierbas y condimentos, ryan cebollas, ajo y especias sin sal para agregar sabor  Use jugo de lima, phani o vinagre para agregar un sabor ácido  Use chiles picantes o yumiko cantidad pequeña de salsa picante para agregar un sabor picante  Limite los alimentos con alto contenido de sal agregada, ryan los siguientes:    Condimentos hechos con sal, ryan sal de ajo, sal de apio, sal de cebolla, sal condimentada, suavizantes para araseli, y glutamato de monosodio (MSG, por renetta siglas en inglés)    Sopa Miso y mezclas para sopa enlatadas o secas    Salsa de soya regular, salsa de [address], [address] [facility], salsa para [facility], [address] [facility], y la mayoría de las vinagres con sabor    Alimentos para merendar, ryan giles tostadas, palomitas de [place], pretzels, piel de cerdo, galletas de soda [facility], y nueces [employer], ryan cenas, entradas, vegetales en salsa, y araseli [employer] sustitutos para la sal  Pregúntele a castro médico si es posible usar sustitutos de la sal  Algunos sustitutos de la sal vienen con ingredientes que pueden ser dañinos si usted tiene ciertos padecimientos médicos  Escoja los alimentos cuidadosamente cuando sale a comer a restaurantes: las comidas de los restaurantes, sobre todo restaurantes de comida rápida, karen siempre son altas en sodio  Algunos restaurantes ofrecen información nutricional que indica la cantidad de sodio en renetta alimentos   Pida que preparen renetta comidas con menos sal o sin sal     Lo que necesita saber acerca de las grasas: Las grasas insaturadas y los ácidos grasos omega-3 son ejemplos de grasas saludables  Las grasas no saludables incluyen las grasas saturadas y las grasas trans  Incluya grasas saludables, ryan las siguientes:     Aceites de cocina, ryan el de soja, canola, mckinnon o girasol    Pescados grasos, ryan el salmón, el atún, la caballa o las patricia    Aceite de linaza o linaza molida    ½ taza de frijoles cocidos, ryan frijoles negros, frijoles rojos o frijoles pintos    1½ onzas de jesus secos bajos en sodio, ryan almendras o nueces    [place] de maní baja en azúcar y sodio    [place], ryan las de chía o girasol       Limite o no consuma grasas poco saludables, ryan los siguientes:     Alimentos que contienen grasa de origen animal, ryan las araseli grasas, la [place], la [place] y la crema    Agia [place]la, margarina en gloria, aceite de cruz y aceite de lamar     Aderezo de ensalada completo o cremoso    Sopa cremosa    Galletas, giles fritas y productos de panadería elaborados con margarina o manteca    Alimentos fritos con grasas poco saludables    Salsa de carne y salsas, ryan la [place] o la de queso    Lo que necesita saber acerca de los carbohidratos: Todos los carbohidratos se descomponen en azúcar  Los carbohidratos complejos contienen más fibra que los simples  [place] significa que los carbohidratos complejos entran en el torrente sanguíneo más lentamente y causan menos picos de azúcar en la yamilet  Intenta incluir más carbohidratos complejos y menos carbohidratos simples    Incluya carbohidratos complejos, ryan los siguientes:     1 tajada de pan integral    1 onza de cereal seco que no contenga azúcar añadida    ½ taza de jani cocida    2 onzas de pasta integral cocida    ½ taza de arroz integral cocido    Limite o no consuma carbohidratos simples, ryan los siguientes:     Productos horneados, ryan rosquillas, pasteles y galletas    Mezclas para pan de maíz y galletas    Arroz aguillon y mezclas de pasta, ryan los [place] con queso de caja    Cereales instantáneos y fríos que contienen azúcar    Sebastien [place] y helado que contienen azúcar    Condimentos, ryan el ketchup    Bebidas con alto contenido en azúcar, ryan refrescos, limonadas y jugos de frutas    Lo que usted necesita saber sobre las verduras y las frutas: Las verduras y las frutas pueden ser frescas, congeladas o enlatadas  Si es posible, trate de elegir opciones enlatadas bajas en sodio  Incluya yumiko variedad de verduras y frutas, ryan las siguientes:     1 Corpus dawn, chiquita o melocotón medianos (aproximadamente ½ taza picada)    ½ banana pequeña    ½ taza de bayas, ryan arándanos, fresas o moras    1 taza de verduras de hoja cheryl crudas, ryan [place], espinacas, col rizada o berza    ½ taza de verduras congeladas o enlatadas (sin sal agregada), ryan judías verdes    ½ taza de fruta fresca, congelada o enlatada (enlatada en sirope liviano o jugo de fruta)    ½ taza de jugo de verduras o frutas    Limite o no consuma verduras y frutas elaboradas de las siguientes maneras:     [place] congelada, ryan las cerezas, con azúcar añadida    Frutas en crema o salsa de [place]    Las verduras enlatadas son altas en sodio  Sauerkraut, vegetales en escabeche, y otros alimentos preparados con vinagre    Vegetales fritos o vegetales en mantequilla o salsas altas en grasas    Lo que necesita saber acerca de los alimentos con proteína:   Incluya alimentos proteicos magros o bajos en grasa, ryan los siguientes:     Gomez kiki (mukul, Moe) sin piel    Pescado (sobre todo pescado con grasa, ryan salmón, atún fresco o caballa)    Everest de res o de cerdo magra (delfina, carne molida extra Hector Angeline)    Claras de [place] y sustitutos del huevo    1 taza de leche descremada o leche 1%    1½ onzas de queso descremado o bajo en grasas    6 onzas de yogurt descremado o bajo en grasas    Limite o no consuma alimentos con alto contenido de proteínas, [facility] siguientes     [address] o [place], ryan carne preparada con maíz, tocineta, jamón, perros calientes, y salchichas    Frijoles enlatados y gomez enlatadas o en pasta, ryan gomez en conserva, patricia, anchoas y [address] de [address] para emparedado, ryan [place], [place], [place], y carne en rebanada    Gomez altas en grasas (biste estilo T-bone, carne molida para hamburguesas, costillas)    Huevos enteros y yemas de [place]    Leche [place], [place] al 2% y crema    Queso normal y queso fundido    Otras pautas que debe seguir:  Mantenga un peso saludable  Castro riesgo de enfermedad cardíaca es aún más alto si usted tiene sobrepeso  Castro médico podría sugerirle que adelgace si tiene sobrepeso  Usted puede perder peso si se propone consumir menos calorías y alimentos que tengan azúcar y grasas agregadas  El plan de alimentación DASH puede ayudarle a lograrlo  Yarely buena forma de disminuir el consumo de calorías es consumiendo porciones más pequeñas en cada comida y menos meriendas entre comidas  Consulte a castro médico para obtener más información sobre cómo adelgazar  Realice actividad física con regularidad  El ejercicio regular puede ayudarle a alcanzar o mantener un peso saludable  El ejercicio regular también puede ayudarle a disminuir castro presión arterial y mejorar renetta niveles de colesterol  Mark ejercicios moderados por 30 minutos o más todos los días de la [place]  Para bajar peso, asegúrese de ejercitarse por lo menos 60 minutos  Consulte con castro médico sobre un programa de ejercicio adecuado para usted  Limite el consumo de alcohol  Las mujeres deberían limitar el consumo de alcohol a 1 bebida por día  Los hombres deberían limitar el consumo de alcohol a 2 tragos al día  Un trago equivale a 12 onzas de cerveza, 5 onzas de vino o 1 onza y ½ de licor  Para más información:  [facility] [id number]  [place]ryn [place] [phone]  Phone: [phone]  Web Address: ItCheaper no    © Copyright [employer] 2022 Information is for End User's use only and may not be sold, redistributed or otherwise used for commercial purposes  All illustrations and images included in CareNotes® are the copyrighted property of [employer]  or [address] [place] [address] es sólo para uso en educación  Castro intención no es darle un consejo médico sobre enfermedades o tratamientos  Colsulte con castro Grimes Yuan farmacéutico antes de seguir cualquier régimen médico para saber si es seguro y efectivo para usted

## 2022-08-12 NOTE — PROGRESS NOTES
Assessment/Plan:  Elevated blood pressure reading  158/104 uncontrolled  continously elevated from chart review with pressures as great as the 180s over 110  Discussed in depth with patient the risks of stroke/cardiovascular complications as he remains reluctant to commence treatment for an unknown reason to himself  BP machine at home but   Decision was made to complete a 24 hour blood pressure recording and if pressures remained elevated [place] is agreeable to commence treatment  - labs cbc, cmp, lipids  - 24hr monitor for BP control  - DASH diet explained   - follow-up with results of BP monitor for consideration of treatment        Nephrolithiasis  History of 7mm nonobstructive calculus at the lower pole of the right kidney in 2019 as per imaging  Was previously seen by Urology & was recommended to f/up in 1 year which pt declined opting for 2nd opinion in the [place]  This has not been performed patient would not like a referral   Asymptomatic at present though endorses intermittent right>left loin to groin type pains, most recent a few months back  No fever, urinary symptoms, hematuria  - Will refer to Urology and defer further imaging at this time     Diagnoses and all orders for this visit:    Need for hepatitis C screening test  -     Hepatitis C Antibody ([facility]); Future    Screening for HIV (human immunodeficiency virus)  -     HIV 1/2 Antigen/Antibody (4th Generation) w Reflex [facility]; Future    Elevated blood pressure reading  -     24 hour blood pressure monitoring; Future  -     CBC and differential; Future  -     Comprehensive metabolic panel; Future  -     Hemoglobin A1C; Future  -     Lipid panel; Future  -     TSH, 3rd generation with Free T4 reflex; Future    Nephrolithiasis  -     Ambulatory Referral to Gastroenterology; Future  -     Ambulatory Referral to Urology; Future          Subjective:    Patient ID: Angel Woodard is a 51 y o  male    [employer]  [id number]    This is a very pleasant 51 y o  male who presents to the clinic for management of their chronic medical conditions  Patient's medical conditions are stable unless noted otherwise above  Patient has not had any recent hospitalizations, or medical emergencies since last visit  Patient has no further complaints other than what is mentioned in the ROS  The following portions of the patient's history were reviewed and updated as appropriate: allergies, current medications, past family history, past medical history, past social history, past surgical history and problem list     Review of Systems   Constitutional: Negative for chills and fever  HENT: Negative for ear pain and sore throat  Eyes: Negative for pain and visual disturbance  Respiratory: Negative for cough and shortness of breath  Cardiovascular: Negative for chest pain and palpitations  Gastrointestinal: Negative for abdominal pain and vomiting  Genitourinary: Negative for dysuria and hematuria  Musculoskeletal: Negative for arthralgias and back pain  Skin: Negative for color change and rash  Neurological: Negative for seizures and syncope  All other systems reviewed and are negative  Objective:  BP (!) 158/104 (BP Location: Left arm, Patient Position: Sitting, Cuff Size: Adult)   Pulse 94   Temp 98 5 °F (36 9 °C) (Temporal)   Resp 18   Wt 78 9 kg (174 lb)   SpO2 96%   BMI 27 25 kg/m²      Physical Exam  Vitals and nursing note reviewed  Constitutional:       General: He is not in acute distress  Appearance: Normal appearance  He is not ill-appearing, toxic-appearing or diaphoretic  HENT:      Head: Normocephalic and atraumatic  Right Ear: External ear normal       Left Ear: External ear normal       Nose: Nose normal       Mouth/Throat:      Mouth: Mucous membranes are moist       Pharynx: No oropharyngeal exudate or posterior oropharyngeal erythema  Eyes:      General: No scleral icterus          Right eye: No discharge  Left eye: No discharge  Conjunctiva/sclera: Conjunctivae normal    Cardiovascular:      Rate and Rhythm: Normal rate and regular rhythm  Pulses: Normal pulses  Heart sounds: Normal heart sounds  No murmur heard  Pulmonary:      Effort: Pulmonary effort is normal  No respiratory distress  Abdominal:      General: Bowel sounds are normal       Palpations: Abdomen is soft  Tenderness: There is no abdominal tenderness  Musculoskeletal:         General: Normal range of motion  Cervical back: Normal range of motion and neck supple  Right lower leg: No edema  Left lower leg: No edema  Skin:     General: Skin is warm  Findings: No rash  Neurological:      General: No focal deficit present  Mental Status: He is alert and oriented to person, place, and time        Gait: Gait normal    Psychiatric:         Mood and Affect: Mood normal

## 2022-08-14 LAB — HIV 1+2 AB+HIV1 P24 AG SERPL QL IA: NORMAL

## 2022-09-20 ENCOUNTER — CONSULT (OUTPATIENT)
Dept: GASTROENTEROLOGY | Facility: MEDICAL CENTER | Age: 47
End: 2022-09-20
Payer: COMMERCIAL

## 2022-09-20 VITALS
OXYGEN SATURATION: 97 % | WEIGHT: 172.6 LBS | HEART RATE: 79 BPM | SYSTOLIC BLOOD PRESSURE: 158 MMHG | HEIGHT: 68 IN | BODY MASS INDEX: 26.16 KG/M2 | DIASTOLIC BLOOD PRESSURE: 90 MMHG

## 2022-09-20 DIAGNOSIS — Z12.11 SCREENING FOR MALIGNANT NEOPLASM OF COLON: Primary | ICD-10-CM

## 2022-09-20 DIAGNOSIS — N20.0 NEPHROLITHIASIS: ICD-10-CM

## 2022-09-20 PROCEDURE — 99203 OFFICE O/P NEW LOW 30 MIN: CPT | Performed by: STUDENT IN AN ORGANIZED HEALTH CARE EDUCATION/TRAINING PROGRAM

## 2022-09-20 RX ORDER — POLYETHYLENE GLYCOL 3350, SODIUM SULFATE ANHYDROUS, SODIUM BICARBONATE, SODIUM CHLORIDE, POTASSIUM CHLORIDE 236; 22.74; 6.74; 5.86; 2.97 G/4L; G/4L; G/4L; G/4L; G/4L
4000 POWDER, FOR SOLUTION ORAL ONCE
Qty: 4000 ML | Refills: 0 | Status: SHIPPED | OUTPATIENT
Start: 2022-09-20 | End: 2022-09-20

## 2022-09-20 NOTE — PATIENT INSTRUCTIONS
Scheduled date of Colon (as of today) 11/4/22  Physician performing Dr Eun Cullen:  Location of procedure  west end:  Bowel prep reviewed with patient: Golytely/dulcolax  Instructions reviewed with patient by: ma  Clearances: na

## 2022-09-20 NOTE — PROGRESS NOTES
[facility] - Outpatient Consultation  Qamar Fuentes 51 y o  male MRN: [id number]  Encounter: [id number]          ASSESSMENT AND PLAN:    1  Screening for malignant neoplasm of colon    2  Nephrolithiasis        51 y o  male w/ hx of nephrolithiasis who is referred to GI for screening colonoscopy  He is at average risk for CRC  Proceed with colonoscopy  - Colonoscopy, Golytely    Orders Placed This Encounter   Procedures    Colonoscopy     ______________________________________________________________________    HPI:    Jada Caballero is a 51 y o  male w/ hx of nephrolithiasis who is referred to GI for screening colonoscopy  Patient denies abdominal pain, diarrhea, constipation, hematochezia, melena, or unintentional weight loss  He had some rectal spasms before which have resolved  No family history of colon cancer  No prior colonoscopy  REVIEW OF SYSTEMS:  As per HPI  Otherwise negative        Historical Information   Past Medical History:   Diagnosis Date    Kidney stone     Urinary pain     Wears glasses      Past Surgical History:   Procedure Laterality Date    FL RETROGRADE PYELOGRAM  5/6/2019    FL RETROGRADE PYELOGRAM  6/3/2019    IA CYSTO/URETERO W/LITHOTRIPSY &INDWELL STENT INSRT Left 6/3/2019    Procedure: CYSTO, URETEROSCOPY W/HOLMIUM LASER, RETROGRADE PYELOGRAM, STENT INSERTION;  Surgeon: Adam Campuzano MD;  Location[facility];  Service: Urology    IA CYSTOURETHROSCOPY,URETER CATHETER Left 5/6/2019    Procedure: CYSTOSCOPY RETROGRADE PYELOGRAM WITH INSERTION STENT URETERAL;  Surgeon: Suzy Mendez MD;  [facility];  Service: Urology     Social History   Social History     Substance and Sexual Activity   Alcohol Use Not Currently     Social History     Substance and Sexual Activity   Drug Use Never     Social History     Tobacco Use   Smoking Status Never Smoker   Smokeless Tobacco Never Used     Family History   Problem Relation Age of Onset    No Known Problems Family        Meds/Allergies       Current Outpatient Medications:     amitriptyline (ELAVIL) 10 mg tablet    Blood Pressure Monitoring (BLOOD PRESSURE CUFF) MISC    polyethylene glycol (Golytely) 4000 mL solution    ibuprofen (MOTRIN) 200 mg tablet    verapamil (CALAN) 40 mg tablet    No Known Allergies        Objective     Blood pressure 158/90, pulse 79, height 5' 8" (1 727 m), weight 78 3 kg (172 lb 9 6 oz), SpO2 97 %  Body mass index is 26 24 kg/m²  PHYSICAL EXAM:      General: No acute distress  Lungs: Clear to ausculation bilaterally  Heart: Regular rate and rhythm  Abdomen: Soft, non-tender, non-distended, normoactive bowel sounds  Neuro: Awake, alert, oriented x 3    Lab Results:   No visits with results within 1 Day(s) from this visit     Latest known visit with results is:   Appointment on 08/12/2022   Component Date Value    WBC 08/12/2022 5 95     RBC 08/12/2022 5 00     Hemoglobin 08/12/2022 14 9     Hematocrit 08/12/2022 45 4     MCV 08/12/2022 91     MCH 08/12/2022 29 8     MCHC 08/12/2022 32 8     RDW 08/12/2022 12 5     MPV 08/12/2022 10 4     Platelets 99/69/8099 261     nRBC 08/12/2022 0     Neutrophils Relative 08/12/2022 54     Immat GRANS % 08/12/2022 0     Lymphocytes Relative 08/12/2022 35     Monocytes Relative 08/12/2022 9     Eosinophils Relative 08/12/2022 1     Basophils Relative 08/12/2022 1     Neutrophils Absolute 08/12/2022 3 25     Immature Grans Absolute 08/12/2022 0 01     Lymphocytes Absolute 08/12/2022 2 08     Monocytes Absolute 08/12/2022 0 51     Eosinophils Absolute 08/12/2022 0 06     Basophils Absolute 08/12/2022 0 04     Sodium 08/12/2022 138     Potassium 08/12/2022 3 9     Chloride 08/12/2022 106     CO2 08/12/2022 27     ANION GAP 08/12/2022 5     BUN 08/12/2022 12     Creatinine 08/12/2022 0 98     Glucose, Fasting 08/12/2022 91     Calcium 08/12/2022 9 0     AST 08/12/2022 25  ALT 08/12/2022 19     Alkaline Phosphatase 08/12/2022 91     Total Protein 08/12/2022 7 6     Albumin 08/12/2022 3 8     Total Bilirubin 08/12/2022 0 51     eGFR 08/12/2022 91     Hemoglobin A1C 08/12/2022 5 6     EAG 08/12/2022 114     Cholesterol 08/12/2022 193     Triglycerides 08/12/2022 152 (A)    HDL, Direct 08/12/2022 41     LDL Calculated 08/12/2022 122 (A)    Non-HDL-Chol (CHOL-HDL) 08/12/2022 152     Hepatitis C Ab 08/12/2022 Non-reactive     HIV-1/HIV-2 Ab 08/12/2022 Non-Reactive     TSH 3RD GENERATON 08/12/2022 0 748          Radiology Results:   No results found

## 2022-09-23 ENCOUNTER — OFFICE VISIT (OUTPATIENT)
Dept: FAMILY MEDICINE CLINIC | Facility: CLINIC | Age: 47
End: 2022-09-23

## 2022-09-23 VITALS
HEART RATE: 83 BPM | DIASTOLIC BLOOD PRESSURE: 108 MMHG | SYSTOLIC BLOOD PRESSURE: 160 MMHG | HEIGHT: 68 IN | WEIGHT: 173 LBS | OXYGEN SATURATION: 99 % | TEMPERATURE: 97.1 F | RESPIRATION RATE: 16 BRPM | BODY MASS INDEX: 26.22 KG/M2

## 2022-09-23 DIAGNOSIS — I10 ESSENTIAL HYPERTENSION: Primary | ICD-10-CM

## 2022-09-23 PROCEDURE — 99213 OFFICE O/P EST LOW 20 MIN: CPT | Performed by: FAMILY MEDICINE

## 2022-09-23 RX ORDER — HYDROCHLOROTHIAZIDE 12.5 MG/1
12.5 TABLET ORAL DAILY
Qty: 30 TABLET | Refills: 0 | Status: SHIPPED | OUTPATIENT
Start: 2022-09-23 | End: 2022-09-23

## 2022-09-23 RX ORDER — HYDROCHLOROTHIAZIDE 25 MG/1
25 TABLET ORAL DAILY
Qty: 30 TABLET | Refills: 2 | Status: SHIPPED | OUTPATIENT
Start: 2022-09-23

## 2022-09-23 NOTE — PROGRESS NOTES
Name: Aleksandra Majano      : 1975      MRN: [id number]  Encounter Provider: Leelee English MD  Encounter Date: 2022   Encounter department: [address] [place] [address]  Essential hypertension  Assessment & Plan:  Multiple  Elevated readings in past  Pt very reluctant to commence Rx   Today remains 160/108  Note prior offer for commencement of Amlopdine which pt did not comply with  Trial of DASH diet & exercise failed  - HCTZ 25mg to commence daily which pt agreeable to  - Return in 6   Weeks as traveling shortly after  Will have renal fx taken the day prior to appt  - Discussed risks of controlling BP and ensuring BP is treated including reducing risk of stroke and heart attack  Orders:  -     Microalbumin / creatinine urine ratio  -     Comprehensive metabolic panel; Future  -     hydrochlorothiazide (HYDRODIURIL) 25 mg tablet; Take 1 tablet (25 mg total) by mouth daily    BMI Counseling: Body mass index is 26 3 kg/m²  The BMI is above normal  Nutrition recommendations include consuming healthier snacks and limiting drinks that contain sugar  Exercise recommendations include exercising 3-5 times per week  Rationale for BMI follow-up plan is due to patient being overweight or obese  Subjective    [employer] [id number]  This is a very pleasant 51 y o  male who presents to the clinic for management of their chronic medical conditions  Patient's medical conditions are stable unless noted otherwise above  Patient has not had any recent hospitalizations, or medical emergencies since last visit  Patient has no further complaints other than what is mentioned in the ROS  Pt states his blood pressure is high as he has not slept as [place] the [place]    Review of Systems   Constitutional: Negative for chills and fever  HENT: Negative for ear pain and sore throat  Eyes: Negative for pain and visual disturbance     Respiratory: Negative for cough and shortness of breath  Cardiovascular: Negative for chest pain and palpitations  Gastrointestinal: Negative for abdominal pain and vomiting  Genitourinary: Negative for dysuria and hematuria  Musculoskeletal: Negative for arthralgias and back pain  Skin: Negative for color change and rash  Neurological: Negative for seizures and syncope  All other systems reviewed and are negative  Current Outpatient Medications on File Prior to Visit   Medication Sig    amitriptyline (ELAVIL) 10 mg tablet Take 1 tablet (10 mg total) by mouth daily at bedtime    Blood Pressure Monitoring (BLOOD PRESSURE CUFF) MISC by Does not apply route 2 (two) times a day    ibuprofen (MOTRIN) 200 mg tablet Take 2 tablets (400 mg total) by mouth every 4 (four) hours as needed for mild pain, moderate pain or headaches    polyethylene glycol (Golytely) 4000 mL solution Take 4,000 mL by mouth once for 1 dose Take 4000 mL by mouth once for 1 dose  Use as directed    verapamil (CALAN) 40 mg tablet Take 1 tablet (40 mg total) by mouth daily       Objective     BP (!) 160/108 (BP Location: Left arm, Patient Position: Sitting, Cuff Size: Standard)   Pulse 83   Temp (!) 97 1 °F (36 2 °C) (Temporal)   Resp 16   Ht 5' 8" (1 727 m)   Wt 78 5 kg (173 lb)   SpO2 99%   BMI 26 30 kg/m²     Physical Exam  Vitals and nursing note reviewed  Constitutional:       General: He is not in acute distress  Appearance: Normal appearance  He is not ill-appearing, toxic-appearing or diaphoretic  HENT:      Head: Normocephalic and atraumatic  Right Ear: External ear normal       Left Ear: External ear normal       Nose: Nose normal       Mouth/Throat:      Mouth: Mucous membranes are moist       Pharynx: No oropharyngeal exudate or posterior oropharyngeal erythema  Eyes:      General: No scleral icterus  Right eye: No discharge  Left eye: No discharge        Conjunctiva/sclera: Conjunctivae normal    Cardiovascular:      Rate and Rhythm: Normal rate and regular rhythm  Pulses: Normal pulses  Heart sounds: Normal heart sounds  No murmur heard  Pulmonary:      Effort: Pulmonary effort is normal  No respiratory distress  Abdominal:      General: Bowel sounds are normal       Palpations: Abdomen is soft  Tenderness: There is no abdominal tenderness  Musculoskeletal:         General: Normal range of motion  Cervical back: Normal range of motion and neck supple  Right lower leg: No edema  Left lower leg: No edema  Skin:     General: Skin is warm  Findings: No rash  Neurological:      General: No focal deficit present  Mental Status: He is alert and oriented to person, place, and time        Gait: Gait normal    Psychiatric:         Mood and Affect: Mood normal        Lauren Ellis MD

## 2022-09-23 NOTE — ASSESSMENT & PLAN NOTE
Multiple  Elevated readings in past  Pt very reluctant to commence Rx   Today remains 160/108  Note prior offer for commencement of Amlopdine which pt did not comply with  Trial of DASH diet & exercise failed  - HCTZ 25mg to commence daily which pt agreeable to  - Return in 6   Weeks as traveling shortly after  Will have renal fx taken the day prior to appt  - Discussed risks of controlling BP and ensuring BP is treated including reducing risk of stroke and heart attack

## 2022-09-28 DIAGNOSIS — R03.0 ELEVATED BLOOD PRESSURE READING: ICD-10-CM

## 2022-09-28 RX ORDER — ADHESIVE BANDAGE 3/4"
BANDAGE TOPICAL 2 TIMES DAILY
Qty: 1 EACH | Refills: 0 | Status: SHIPPED | OUTPATIENT
Start: 2022-09-28

## 2022-10-20 ENCOUNTER — OFFICE VISIT (OUTPATIENT)
Dept: FAMILY MEDICINE CLINIC | Facility: CLINIC | Age: 47
End: 2022-10-20

## 2022-10-20 VITALS
WEIGHT: 173 LBS | DIASTOLIC BLOOD PRESSURE: 108 MMHG | OXYGEN SATURATION: 99 % | HEART RATE: 91 BPM | RESPIRATION RATE: 18 BRPM | TEMPERATURE: 97.3 F | SYSTOLIC BLOOD PRESSURE: 158 MMHG | BODY MASS INDEX: 26.22 KG/M2 | HEIGHT: 68 IN

## 2022-10-20 DIAGNOSIS — I10 ESSENTIAL HYPERTENSION: Primary | ICD-10-CM

## 2022-10-20 PROCEDURE — 99213 OFFICE O/P EST LOW 20 MIN: CPT | Performed by: FAMILY MEDICINE

## 2022-10-20 NOTE — PROGRESS NOTES
Name: Richelle Hopkins      : 1975      MRN: [id number]  Encounter Provider: Margoth Delacruz MD  Encounter Date: 10/20/2022   Encounter department: [address] [place] [address]     1  Essential hypertension  Assessment & Plan:  158/108 in clinic today  Continues to be elevated despite commencing 25mg HCTZ daily  Very disgruntled today  States his MD brother informed him this medication is not appropriate nor enough for him  Challenging consultation spent discussing that pt was initially reluctant to begin any medications  States 100% compliance with HCTZ  Did feel dizzy a couple days after taking  Explained titration possible, but in view of erratic sleep:wake cycle for work will commence amlodipine in the evenings and continue hctz during daytime     - HCTZ 25mg po QAM  - Amlodipine 5mg HS which may benefit diastolic reading   - Return with home BP log in 2 weeks with renal fx one day prior  Orders:  -     Basic metabolic panel; Future  -     POCT urine microalbumin/creatinine; Future; Expected date: 2022  -     amLODIPine (NORVASC) 5 mg tablet; Take 1 tablet (5 mg total) by mouth daily         Subjective     This is a very pleasant 51 y o  male who presents to the clinic for management of his HTN  Patient's medical conditions are stable unless noted otherwise above  Patient has not had any recent hospitalizations, or medical emergencies since last visit  Patient has no further complaints other than what is mentioned in the ROS  Review of Systems   Constitutional: Negative for chills, fatigue and fever  HENT: Negative for trouble swallowing  Eyes: Negative for visual disturbance  Respiratory: Negative for cough and shortness of breath  Cardiovascular: Negative for chest pain, palpitations and leg swelling  Gastrointestinal: Negative for abdominal pain, blood in stool, constipation, diarrhea, nausea and vomiting     Genitourinary: Negative for dysuria and hematuria  Musculoskeletal: Negative for back pain and gait problem  Skin: Negative for rash  Neurological: Positive for light-headedness  Negative for dizziness and headaches  2 occassions within 2 hours of taking HCTZ  Resolved within minutes  BP was checked with systolic in 020'Q   Psychiatric/Behavioral: The patient is not nervous/anxious  Current Outpatient Medications on File Prior to Visit   Medication Sig   • amitriptyline (ELAVIL) 10 mg tablet Take 1 tablet (10 mg total) by mouth daily at bedtime   • Blood Pressure Monitoring (Blood Pressure Cuff) MISC Use 2 (two) times a day   • hydrochlorothiazide (HYDRODIURIL) 25 mg tablet Take 1 tablet (25 mg total) by mouth daily   • ibuprofen (MOTRIN) 200 mg tablet Take 2 tablets (400 mg total) by mouth every 4 (four) hours as needed for mild pain, moderate pain or headaches   • polyethylene glycol (Golytely) 4000 mL solution Take 4,000 mL by mouth once for 1 dose Take 4000 mL by mouth once for 1 dose  Use as directed   • [DISCONTINUED] verapamil (CALAN) 40 mg tablet Take 1 tablet (40 mg total) by mouth daily       Objective     BP (!) 158/108 (BP Location: Left arm, Patient Position: Sitting, Cuff Size: Adult)   Pulse 91   Temp (!) 97 3 °F (36 3 °C) (Temporal)   Resp 18   Ht 5' 8" (1 727 m)   Wt 78 5 kg (173 lb)   SpO2 99%   BMI 26 30 kg/m²     Physical Exam  Vitals and nursing note reviewed  Constitutional:       General: He is not in acute distress  Appearance: Normal appearance  He is not ill-appearing, toxic-appearing or diaphoretic  HENT:      Head: Normocephalic and atraumatic  Right Ear: External ear normal       Left Ear: External ear normal       Nose: Nose normal       Mouth/Throat:      Mouth: Mucous membranes are moist       Pharynx: No oropharyngeal exudate or posterior oropharyngeal erythema  Eyes:      General: No scleral icterus  Right eye: No discharge           Left eye: No discharge  Conjunctiva/sclera: Conjunctivae normal    Cardiovascular:      Rate and Rhythm: Normal rate and regular rhythm  Pulses: Normal pulses  Heart sounds: Normal heart sounds  No murmur heard  Pulmonary:      Effort: Pulmonary effort is normal  No respiratory distress  Abdominal:      General: Bowel sounds are normal       Palpations: Abdomen is soft  Tenderness: There is no abdominal tenderness  Musculoskeletal:         General: Normal range of motion  Cervical back: Normal range of motion and neck supple  Right lower leg: No edema  Left lower leg: No edema  Skin:     General: Skin is warm  Findings: No rash  Neurological:      General: No focal deficit present  Mental Status: He is alert and oriented to person, place, and time        Gait: Gait normal    Psychiatric:         Mood and Affect: Mood normal        Donna Goyal MD

## 2022-10-21 RX ORDER — AMLODIPINE BESYLATE 5 MG/1
5 TABLET ORAL DAILY
Qty: 30 TABLET | Refills: 0 | Status: SHIPPED | OUTPATIENT
Start: 2022-10-21

## 2022-10-21 NOTE — ASSESSMENT & PLAN NOTE
158/108 in clinic today  Continues to be elevated despite commencing 25mg HCTZ daily  Very disgruntled today  States his MD brother informed him this medication is not appropriate nor enough for him  Challenging consultation spent discussing that pt was initially reluctant to begin any medications  States 100% compliance with HCTZ  Did feel dizzy a couple days after taking  Explained titration possible, but in view of erratic sleep:wake cycle for work will commence amlodipine in the evenings and continue hctz during daytime     - HCTZ 25mg po QAM  - Amlodipine 5mg HS which may benefit diastolic reading   - Return with home BP log in 2 weeks with renal fx one day prior

## 2022-11-02 RX ORDER — SODIUM CHLORIDE 9 MG/ML
125 INJECTION, SOLUTION INTRAVENOUS CONTINUOUS
Status: CANCELLED | OUTPATIENT
Start: 2022-11-02

## 2022-11-04 ENCOUNTER — HOSPITAL ENCOUNTER (OUTPATIENT)
Dept: GASTROENTEROLOGY | Facility: MEDICAL CENTER | Age: 47
Setting detail: OUTPATIENT SURGERY
End: 2022-11-04

## 2022-11-04 ENCOUNTER — ANESTHESIA (OUTPATIENT)
Dept: GASTROENTEROLOGY | Facility: MEDICAL CENTER | Age: 47
End: 2022-11-04

## 2022-11-04 ENCOUNTER — ANESTHESIA EVENT (OUTPATIENT)
Dept: GASTROENTEROLOGY | Facility: MEDICAL CENTER | Age: 47
End: 2022-11-04

## 2022-11-04 VITALS
OXYGEN SATURATION: 99 % | WEIGHT: 173.06 LBS | BODY MASS INDEX: 26.23 KG/M2 | TEMPERATURE: 98.6 F | DIASTOLIC BLOOD PRESSURE: 80 MMHG | HEART RATE: 85 BPM | SYSTOLIC BLOOD PRESSURE: 130 MMHG | RESPIRATION RATE: 20 BRPM | HEIGHT: 68 IN

## 2022-11-04 DIAGNOSIS — Z12.11 SCREENING FOR MALIGNANT NEOPLASM OF COLON: ICD-10-CM

## 2022-11-04 RX ORDER — PROPOFOL 10 MG/ML
INJECTION, EMULSION INTRAVENOUS AS NEEDED
Status: DISCONTINUED | OUTPATIENT
Start: 2022-11-04 | End: 2022-11-04

## 2022-11-04 RX ORDER — LIDOCAINE HYDROCHLORIDE 20 MG/ML
INJECTION, SOLUTION EPIDURAL; INFILTRATION; INTRACAUDAL; PERINEURAL AS NEEDED
Status: DISCONTINUED | OUTPATIENT
Start: 2022-11-04 | End: 2022-11-04

## 2022-11-04 RX ORDER — SODIUM CHLORIDE 9 MG/ML
125 INJECTION, SOLUTION INTRAVENOUS CONTINUOUS
Status: DISCONTINUED | OUTPATIENT
Start: 2022-11-04 | End: 2022-11-08 | Stop reason: HOSPADM

## 2022-11-04 RX ADMIN — PROPOFOL 100 MG: 10 INJECTION, EMULSION INTRAVENOUS at 14:15

## 2022-11-04 RX ADMIN — LIDOCAINE HYDROCHLORIDE 100 MG: 20 INJECTION, SOLUTION EPIDURAL; INFILTRATION; INTRACAUDAL; PERINEURAL at 14:15

## 2022-11-04 RX ADMIN — PROPOFOL 30 MG: 10 INJECTION, EMULSION INTRAVENOUS at 14:22

## 2022-11-04 RX ADMIN — PROPOFOL 30 MG: 10 INJECTION, EMULSION INTRAVENOUS at 14:20

## 2022-11-04 RX ADMIN — SODIUM CHLORIDE 125 ML/HR: 0.9 INJECTION, SOLUTION INTRAVENOUS at 13:50

## 2022-11-04 RX ADMIN — PROPOFOL 30 MG: 10 INJECTION, EMULSION INTRAVENOUS at 14:18

## 2022-11-04 RX ADMIN — PROPOFOL 20 MG: 10 INJECTION, EMULSION INTRAVENOUS at 14:30

## 2022-11-04 RX ADMIN — PROPOFOL 30 MG: 10 INJECTION, EMULSION INTRAVENOUS at 14:24

## 2022-11-04 RX ADMIN — PROPOFOL 30 MG: 10 INJECTION, EMULSION INTRAVENOUS at 14:16

## 2022-11-04 RX ADMIN — PROPOFOL 30 MG: 10 INJECTION, EMULSION INTRAVENOUS at 14:27

## 2022-11-04 NOTE — H&P
History and Physical - [facility]  Tarikauraterry LuevanoclarenceAshok 51 y o  male MRN: [id number]          HPI: Oneida Lancaster is a 51y o  year old male who presents for initial screening colonoscopy  No family history of colon cancer  REVIEW OF SYSTEMS: Per the HPI, and otherwise unremarkable      Historical Information   Past Medical History:   Diagnosis Date   • Hypertension    • Kidney stone    • Urinary pain    • Wears glasses      Past Surgical History:   Procedure Laterality Date   • FL RETROGRADE PYELOGRAM  05/06/2019   • FL RETROGRADE PYELOGRAM  06/03/2019   • LITHOTRIPSY     • SC CYSTO/URETERO W/LITHOTRIPSY &INDWELL STENT INSRT Left 06/03/2019    Procedure: CYSTO, URETEROSCOPY W/HOLMIUM LASER, RETROGRADE PYELOGRAM, STENT INSERTION;  Surgeon: Danielle Smith MD;  Location: [facility];  Service: Urology   • SC CYSTOURETHROSCOPY,URETER CATHETER Left 05/06/2019    Procedure: CYSTOSCOPY RETROGRADE PYELOGRAM WITH INSERTION STENT URETERAL;  Surgeon: Francine Ordaz MD;  Location: [facility];  Service: Urology     Social History   Social History     Substance and Sexual Activity   Alcohol Use Not Currently     Social History     Substance and Sexual Activity   Drug Use Never     Social History     Tobacco Use   Smoking Status Never Smoker   Smokeless Tobacco Never Used     Family History   Problem Relation Age of Onset   • No Known Problems Family        Meds/Allergies       Current Outpatient Medications:   •  amitriptyline (ELAVIL) 10 mg tablet  •  amLODIPine (NORVASC) 5 mg tablet  •  Blood Pressure Monitoring (Blood Pressure Cuff) MISC  •  hydrochlorothiazide (HYDRODIURIL) 25 mg tablet  •  ibuprofen (MOTRIN) 200 mg tablet  •  polyethylene glycol (Golytely) 4000 mL solution    Current Facility-Administered Medications:   •  sodium chloride 0 9 % infusion, 125 mL/hr, Intravenous, Continuous, 125 mL/hr at 11/04/22 1350    No Known Allergies    Objective     /95   Pulse 86   Temp 98 6 °F (37 °C) (Temporal)   Resp 15   Ht 5' 8" (1 727 m)   Wt 78 5 kg (173 lb 1 oz)   SpO2 99%   BMI 26 31 kg/m²       PHYSICAL EXAM    GEN: NAD  CARDIO: RRR  PULM: CTA bilaterally  ABD: soft, non-tender, non-distended  EXT: no lower extremity edema  NEURO: AAOx3      ASSESSMENT/PLAN:  51y o  year old male here for colonoscopy; he is stable and optimized for his procedure

## 2022-11-04 NOTE — ANESTHESIA PREPROCEDURE EVALUATION
Procedure:  COLONOSCOPY    Relevant Problems   CARDIO   (+) Essential hypertension                                        Physical Exam    Airway    Mallampati score: II  TM Distance: >3 FB  Neck ROM: full     Dental       Cardiovascular  Rhythm: regular, Rate: normal,     Pulmonary  Breath sounds clear to auscultation,     Other Findings        Anesthesia Plan  ASA Score- 2     Anesthesia Type- IV sedation with anesthesia with ASA Monitors  Additional Monitors:   Airway Plan:           Plan Factors-Exercise tolerance (METS): >4 METS  Chart reviewed  Existing labs reviewed  Induction- intravenous  Postoperative Plan-     Informed Consent- Anesthetic plan and risks discussed with patient

## 2022-11-04 NOTE — ANESTHESIA POSTPROCEDURE EVALUATION
Post-Op Assessment Note    CV Status:  Stable    Pain management: adequate     Mental Status:  Alert and awake   Hydration Status:  Euvolemic   PONV Controlled:  Controlled   Airway Patency:  Patent      Post Op Vitals Reviewed: Yes      Staff: Anesthesiologist         No complications documented      BP      Temp      Pulse     Resp      SpO2      /80   Pulse 85   Temp 98 6 °F (37 °C) (Temporal)   Resp 20   Ht 5' 8" (1 727 m)   Wt 78 5 kg (173 lb 1 oz)   SpO2 99%   BMI 26 31 kg/m²

## 2022-11-09 DIAGNOSIS — I10 ESSENTIAL HYPERTENSION: ICD-10-CM

## 2022-11-10 RX ORDER — HYDROCHLOROTHIAZIDE 25 MG/1
25 TABLET ORAL DAILY
Qty: 30 TABLET | Refills: 2 | Status: SHIPPED | OUTPATIENT
Start: 2022-11-10

## 2022-11-11 ENCOUNTER — OFFICE VISIT (OUTPATIENT)
Dept: UROLOGY | Facility: CLINIC | Age: 47
End: 2022-11-11

## 2022-11-11 VITALS
HEART RATE: 72 BPM | HEIGHT: 68 IN | SYSTOLIC BLOOD PRESSURE: 148 MMHG | DIASTOLIC BLOOD PRESSURE: 88 MMHG | BODY MASS INDEX: 25.91 KG/M2 | WEIGHT: 171 LBS

## 2022-11-11 DIAGNOSIS — N20.0 NEPHROLITHIASIS: ICD-10-CM

## 2022-11-11 DIAGNOSIS — Z12.5 SCREENING FOR PROSTATE CANCER: Primary | ICD-10-CM

## 2022-11-15 DIAGNOSIS — I10 ESSENTIAL HYPERTENSION: ICD-10-CM

## 2022-11-16 RX ORDER — AMLODIPINE BESYLATE 5 MG/1
5 TABLET ORAL DAILY
Qty: 30 TABLET | Refills: 0 | Status: SHIPPED | OUTPATIENT
Start: 2022-11-16

## 2022-11-30 ENCOUNTER — APPOINTMENT (OUTPATIENT)
Dept: LAB | Facility: CLINIC | Age: 47
End: 2022-11-30

## 2022-11-30 DIAGNOSIS — I10 ESSENTIAL HYPERTENSION: ICD-10-CM

## 2022-11-30 DIAGNOSIS — Z12.5 SCREENING FOR PROSTATE CANCER: ICD-10-CM

## 2022-11-30 LAB — PSA SERPL-MCNC: 1.3 NG/ML (ref 0–4)

## 2022-12-01 LAB
ALBUMIN SERPL BCP-MCNC: 4.1 G/DL (ref 3.5–5)
ALP SERPL-CCNC: 105 U/L (ref 46–116)
ALT SERPL W P-5'-P-CCNC: 23 U/L (ref 12–78)
ANION GAP SERPL CALCULATED.3IONS-SCNC: 4 MMOL/L (ref 4–13)
AST SERPL W P-5'-P-CCNC: 22 U/L (ref 5–45)
BILIRUB SERPL-MCNC: 0.24 MG/DL (ref 0.2–1)
BUN SERPL-MCNC: 17 MG/DL (ref 5–25)
CALCIUM SERPL-MCNC: 9.4 MG/DL (ref 8.3–10.1)
CHLORIDE SERPL-SCNC: 106 MMOL/L (ref 96–108)
CO2 SERPL-SCNC: 27 MMOL/L (ref 21–32)
CREAT SERPL-MCNC: 1.02 MG/DL (ref 0.6–1.3)
CREAT UR-MCNC: 53.3 MG/DL
GFR SERPL CREATININE-BSD FRML MDRD: 86 ML/MIN/1.73SQ M
GLUCOSE SERPL-MCNC: 82 MG/DL (ref 65–140)
MICROALBUMIN UR-MCNC: 10.7 MG/L (ref 0–20)
MICROALBUMIN/CREAT 24H UR: 20 MG/G CREATININE (ref 0–30)
POTASSIUM SERPL-SCNC: 3.9 MMOL/L (ref 3.5–5.3)
PROT SERPL-MCNC: 8 G/DL (ref 6.4–8.4)
SODIUM SERPL-SCNC: 137 MMOL/L (ref 135–147)

## 2022-12-07 ENCOUNTER — OFFICE VISIT (OUTPATIENT)
Dept: FAMILY MEDICINE CLINIC | Facility: CLINIC | Age: 47
End: 2022-12-07

## 2022-12-07 VITALS
BODY MASS INDEX: 26.37 KG/M2 | RESPIRATION RATE: 18 BRPM | TEMPERATURE: 97.8 F | OXYGEN SATURATION: 99 % | SYSTOLIC BLOOD PRESSURE: 128 MMHG | HEIGHT: 68 IN | DIASTOLIC BLOOD PRESSURE: 88 MMHG | WEIGHT: 174 LBS | HEART RATE: 83 BPM

## 2022-12-07 DIAGNOSIS — Z23 ENCOUNTER FOR ADMINISTRATION OF VACCINE: ICD-10-CM

## 2022-12-07 DIAGNOSIS — I10 ESSENTIAL HYPERTENSION: Primary | ICD-10-CM

## 2022-12-07 NOTE — PROGRESS NOTES
Name: Kasia Martínez      : 1975      MRN: [id number]  Encounter Provider: Nabil Butcher MD  Encounter Date: 2022   Encounter department: [address] [place] [address]  Essential hypertension  Assessment & Plan:  Return to clinic for monitoring of new regime  Compliant with 5 mg daily of amlodipine without any side effects  Blood pressure log provided today with systolic ranging from 009-155 & diastolic range 72-99  Asymptomatic  Exam unremarkable without pitting edema  Labs:  normal renal function    -Continue on amlodipine 5 mg daily for the next 4 months and review in clinic      2  Encounter for administration of vaccine  Comments:  Patient declined flu vaccine today  Patient offered tetanus vaccine and given patient information leaflet in Azeri for him to reconsider         Subjective      This is a very pleasant 49 y o  male who presents to the clinic for management of their chronic medical conditions  Patient's medical conditions are stable unless noted otherwise above  Patient has not had any recent hospitalizations, or medical emergencies since last visit  Patient has no further complaints other than what is mentioned in the ROS  Review of Systems   Constitutional: Negative for chills and fever  HENT: Negative for ear pain and sore throat  Eyes: Negative for pain and visual disturbance  Respiratory: Negative for cough and shortness of breath  Cardiovascular: Negative for chest pain and palpitations  Gastrointestinal: Negative for abdominal pain and vomiting  Genitourinary: Negative for dysuria and hematuria  Musculoskeletal: Negative for arthralgias and back pain  Skin: Negative for color change and rash  Neurological: Negative for seizures and syncope  All other systems reviewed and are negative        Current Outpatient Medications on File Prior to Visit   Medication Sig   • amLODIPine (NORVASC) 5 mg tablet Take 1 tablet (5 mg total) by mouth daily   • Blood Pressure Monitoring (Blood Pressure Cuff) MISC Use 2 (two) times a day   • [DISCONTINUED] amitriptyline (ELAVIL) 10 mg tablet Take 1 tablet (10 mg total) by mouth daily at bedtime (Patient not taking: Reported on 11/11/2022)   • [DISCONTINUED] hydrochlorothiazide (HYDRODIURIL) 25 mg tablet Take 1 tablet (25 mg total) by mouth daily   • [DISCONTINUED] ibuprofen (MOTRIN) 200 mg tablet Take 2 tablets (400 mg total) by mouth every 4 (four) hours as needed for mild pain, moderate pain or headaches   • [DISCONTINUED] polyethylene glycol (Golytely) 4000 mL solution Take 4,000 mL by mouth once for 1 dose Take 4000 mL by mouth once for 1 dose  Use as directed       Objective     /88 (BP Location: Left arm, Patient Position: Sitting, Cuff Size: Adult)   Pulse 83   Temp 97 8 °F (36 6 °C) (Temporal)   Resp 18   Ht 5' 8" (1 727 m)   Wt 78 9 kg (174 lb)   SpO2 99%   BMI 26 46 kg/m²     Physical Exam  Vitals and nursing note reviewed  Constitutional:       General: He is not in acute distress  Appearance: Normal appearance  He is not ill-appearing, toxic-appearing or diaphoretic  HENT:      Head: Normocephalic and atraumatic  Right Ear: External ear normal       Left Ear: External ear normal       Nose: Nose normal       Mouth/Throat:      Mouth: Mucous membranes are moist       Pharynx: No oropharyngeal exudate or posterior oropharyngeal erythema  Eyes:      General: No scleral icterus  Right eye: No discharge  Left eye: No discharge  Conjunctiva/sclera: Conjunctivae normal    Cardiovascular:      Rate and Rhythm: Normal rate and regular rhythm  Pulses: Normal pulses  Heart sounds: Normal heart sounds  No murmur heard  Pulmonary:      Effort: Pulmonary effort is normal  No respiratory distress  Abdominal:      General: Bowel sounds are normal       Palpations: Abdomen is soft  Tenderness:  There is no abdominal tenderness  Musculoskeletal:         General: Normal range of motion  Cervical back: Normal range of motion and neck supple  Right lower leg: No edema  Left lower leg: No edema  Skin:     General: Skin is warm  Findings: No rash  Neurological:      General: No focal deficit present  Mental Status: He is alert and oriented to person, place, and time        Gait: Gait normal    Psychiatric:         Mood and Affect: Mood normal        Nima Vaughan MD

## 2022-12-07 NOTE — ASSESSMENT & PLAN NOTE
Return to clinic for monitoring of new regime  Compliant with 5 mg daily of amlodipine without any side effects  Blood pressure log provided today with systolic ranging from 014-852 & diastolic range 47-64  Asymptomatic    Exam unremarkable without pitting edema  Labs: 11/30 normal renal function    -Continue on amlodipine 5 mg daily for the next 4 months and review in clinic

## 2022-12-07 NOTE — PATIENT INSTRUCTIONS
El tétano   LO QUE NECESITA SABER:   ¿Qué es el tétano? El tétano es yumiko enfermedad provocada por yumiko infección bacteriana  La bacteria por lo general se encuentra en la bladimir, polvo y en las evacuaciones intestinales de algunos animales y [place]  Con frecuencia, las bacterias entran al cuerpo a través de [place]  La bacteria produce yumiko toxina que daña los nervios  [place] provoca espasmos musculares severos  Si se spring sin tratamiento, el tétano puede representar Vishal Armand para la tia  ¿Cuáles son los signos y síntomas del tétano? Es posible que usted tenga los músculos rígidos y débiles solamente en el área de la herida  [place] se conoce ryan tétano localizado  Los síntomas podrían desaparecer sin tratamiento, o podrían propagarse  La infección que se propaga se conoce ryan generalizada  Es posible que usted desarrolle cualquiera de los siguientes dentro de días o semanas de contraer la infección:  Trismo (espasmo muscular en la mandíbula y jackelin que traba castro mandíbula)    Espasmos musculares que son intensamente dolorosos, con frecuencia se provocan con el ruido, la black o el tacto    Músculos faciales rígidos, o archie levantadas con yumiko sonrisa sardónica    Abdomen, músculos del brazo o de la pierna rígidos    Dificultad para respirar o tragar    Sentirse inquieto o irritable, o latido cardíaco o respiración acelerados    Dolor de liana o convulsiones    Sudor, dificultad para orinar o fiebre baja    ¿Cómo se diagnostica y se trata el tétano? Castro médico le examinará la herida y le preguntará acerca de renetta síntomas  Infórmele si no se limpió la herida inmediatamente o si usted bhavya bladimir u otros objetos en la herida  No hay exámenes disponibles para revisar el tétano  Castro médico buscará ciertos signos o síntomas, ryan el trismo, para ayudar a diagnosticar el tétano   Si la infección se vuelve generalizada, ted necesitará recibir [employer] hospital  A usted lo mantendrán en un cuarto oscuro y tranquilo para evitar los espasmos musculares  Es posible que también necesite lo siguiente:  Los medicamentos Podrían administrarse para detener o evitar las convulsiones y los espasmos musculares  Podrían darle antitoxina para detener la propagación de la toxina a castro cuerpo  Podrían administrarle medicamento para combatir yumiko infección bacteriana o para controlar el dolor  Castro médico también podría darle yumiko dosis de vacuna contra el tétano  La cirugía podría usarse para remover el tejido afectado por el tétano  El desbridamiento es un tipo de cirugía que se [place] para limpiar yumiko herida y remover el tejido [place]  [employer] bladimir o el albert también serán removidos  ¿Qué puedo hacer para evitar el tétano? Limpie cada herida inmediatamente  Aplique presión a castro herida para detener cualquier sangrado  Limpie la herida con Damon Lester y [place]  Quite la bladimir u otros objetos de la herida  Cubra la herida con un vendaje limpio  Cambie el vendaje a diario, y también si se moja o se ensucia  Consulte con castro [place] vacunas  Yumiko infección por tétano no lo hace inmune a otra infección  Las vacunas DTaP y Tdap ayudan a proteger contra el tétano  Castro médico puede recomendar las vacunas que son [place] vagas para usted de acuerdo a castro edad y a castro estado de [place]  La vacuna Td generalmente se aplica cada 10 años  Usted necesitará también la vacuna Td después de hacerse yumiko herida, si no ha recibido yumiko dosis de [employer] últimos 5 años  Las mujeres embarazadas deben recibir 1 dosis de la vacuna Tdap en cada embarazo, entre las semanas 27 y 39 de gestación  Llame al [place] de emergencias local (911 en los [place]) si:  Usted tiene dificultad para respirar o tragar  Llame a castro médico si:  Castro latido cardíaco está muy acelerado o no es regular  Usted tiene espasmos musculares en la laurent  Usted comienza a sentir calambres o espasmos musculares cerca de yumiko herida      Usted tiene yumiko herida que está pina o que no se puede limpiar  Usted tiene yumiko herida abierta o yumiko herida de punción  Usted no sabe si renetta vacunas contra el tétano están actualizadas  Usted necesita yumiko vacuna de refuerzo contra el tétano  Usted tiene preguntas o inquietudes acerca de castro condición o cuidado  ACUERDOS SOBRE CASTRO CUIDADO:   Usted tiene el derecho de ayudar a planear castro cuidado  Aprenda todo lo que pueda sobre castro condición y ryan darle tratamiento  Discuta renetta opciones de tratamiento con renetta médicos para decidir el cuidado que usted desea recibir  Usted siempre tiene el derecho de rechazar el tratamiento  Esta información es sólo para uso en educación  Castro intención no es darle un consejo médico sobre enfermedades o tratamientos  Colsulte con castro Yusuf Keas farmacéutico antes de seguir cualquier régimen médico para saber si es seguro y efectivo para usted  © Copyright [employer] 2022 Information is for End User's use only and may not be sold, redistributed or otherwise used for commercial purposes   All illustrations and images included in CareNotes® are the copyrighted property of A D A M , Inc  or [address] [place] [address]

## 2022-12-20 DIAGNOSIS — I10 ESSENTIAL HYPERTENSION: ICD-10-CM

## 2022-12-20 RX ORDER — AMLODIPINE BESYLATE 5 MG/1
5 TABLET ORAL DAILY
Qty: 30 TABLET | Refills: 0 | Status: SHIPPED | OUTPATIENT
Start: 2022-12-20

## 2022-12-21 ENCOUNTER — TELEPHONE (OUTPATIENT)
Dept: FAMILY MEDICINE CLINIC | Facility: CLINIC | Age: 47
End: 2022-12-21

## 2022-12-21 NOTE — TELEPHONE ENCOUNTER
Pt called the nurse line requesting med refill  I called pt back to informed medication was sent yesterday

## 2023-01-26 DIAGNOSIS — I10 ESSENTIAL HYPERTENSION: ICD-10-CM

## 2023-01-30 RX ORDER — AMLODIPINE BESYLATE 5 MG/1
5 TABLET ORAL DAILY
Qty: 30 TABLET | Refills: 1 | Status: SHIPPED | OUTPATIENT
Start: 2023-01-30

## 2023-03-03 DIAGNOSIS — I10 ESSENTIAL HYPERTENSION: ICD-10-CM

## 2023-03-03 RX ORDER — AMLODIPINE BESYLATE 5 MG/1
5 TABLET ORAL DAILY
Qty: 30 TABLET | Refills: 1 | Status: SHIPPED | OUTPATIENT
Start: 2023-03-03 | End: 2023-03-09 | Stop reason: SDUPTHER

## 2023-03-09 ENCOUNTER — OFFICE VISIT (OUTPATIENT)
Dept: FAMILY MEDICINE CLINIC | Facility: CLINIC | Age: 48
End: 2023-03-09

## 2023-03-09 VITALS
SYSTOLIC BLOOD PRESSURE: 132 MMHG | TEMPERATURE: 97.5 F | WEIGHT: 175 LBS | DIASTOLIC BLOOD PRESSURE: 82 MMHG | BODY MASS INDEX: 26.61 KG/M2 | OXYGEN SATURATION: 98 % | HEART RATE: 88 BPM | RESPIRATION RATE: 17 BRPM

## 2023-03-09 DIAGNOSIS — I10 ESSENTIAL HYPERTENSION: Primary | ICD-10-CM

## 2023-03-09 DIAGNOSIS — Z28.21 TETANUS, DIPHTHERIA, AND ACELLULAR PERTUSSIS (TDAP) VACCINATION DECLINED: ICD-10-CM

## 2023-03-09 RX ORDER — AMLODIPINE BESYLATE 5 MG/1
5 TABLET ORAL DAILY
Qty: 90 TABLET | Refills: 1 | Status: SHIPPED | OUTPATIENT
Start: 2023-03-09

## 2023-03-09 NOTE — ASSESSMENT & PLAN NOTE
Initially elevated --> Rpt 132/82  continues with Amlodipine at his preference and with desires from his brother (MD) too  Pt states home readings are <130/90 persistently      - Refill of amlodipine 5mg sent to pharmacy

## 2023-03-09 NOTE — PROGRESS NOTES
Name: Alessia Luz      : 1975      MRN: [id number]  Encounter Provider: Buddy Paula MD  Encounter Date: 3/9/2023   Encounter department: [id number] [place] [address]     1  Essential hypertension  Assessment & Plan:  Initially elevated --> Rpt 132/82  continues with Amlodipine at his preference and with desires from his brother (MD) too  Pt states home readings are <130/90 persistently  - Refill of amlodipine 5mg sent to pharmacy    Orders:  -     amLODIPine (NORVASC) 5 mg tablet; Take 1 tablet (5 mg total) by mouth daily    2  Tetanus, diphtheria, and acellular pertussis (Tdap) vaccination declined  Comments:  States believes he had it though >10yrs ago  Declines at this time  Will read about it and return if desires  - Counselling offered  Subjective      [facility] [id number]  This is a very pleasant 49 y o  male who presents to the clinic for management of their chronic medical conditions  Patient's medical conditions are stable unless noted otherwise above  Patient has not had any recent hospitalizations, or medical emergencies since last visit  Patient has no further complaints other than what is mentioned in the ROS  Review of Systems   Constitutional: Negative for chills and fever  HENT: Negative for ear pain and sore throat  Eyes: Negative for pain and visual disturbance  Respiratory: Negative for cough and shortness of breath  Cardiovascular: Negative for chest pain and palpitations  Gastrointestinal: Negative for abdominal pain and vomiting  Genitourinary: Negative for dysuria and hematuria  Musculoskeletal: Negative for arthralgias and back pain  Skin: Negative for color change and rash  Neurological: Negative for seizures and syncope  All other systems reviewed and are negative        Current Outpatient Medications on File Prior to Visit   Medication Sig   • Blood Pressure Monitoring (Blood Pressure Cuff) MISC Use 2 (two) times a day   • [DISCONTINUED] amLODIPine (NORVASC) 5 mg tablet Take 1 tablet (5 mg total) by mouth daily   • [DISCONTINUED] polyethylene glycol (Golytely) 4000 mL solution Take 4,000 mL by mouth once for 1 dose Take 4000 mL by mouth once for 1 dose  Use as directed       Objective     /82 (BP Location: Left arm, Patient Position: Sitting, Cuff Size: Standard)   Pulse 88   Temp 97 5 °F (36 4 °C) (Temporal)   Resp 17   Wt 79 4 kg (175 lb)   SpO2 98%   BMI 26 61 kg/m²     Physical Exam  Vitals and nursing note reviewed  Constitutional:       General: He is not in acute distress  Appearance: Normal appearance  He is not ill-appearing, toxic-appearing or diaphoretic  HENT:      Head: Normocephalic and atraumatic  Right Ear: External ear normal       Left Ear: External ear normal       Nose: Nose normal       Mouth/Throat:      Mouth: Mucous membranes are moist       Pharynx: No oropharyngeal exudate or posterior oropharyngeal erythema  Eyes:      General: No scleral icterus  Right eye: No discharge  Left eye: No discharge  Conjunctiva/sclera: Conjunctivae normal    Cardiovascular:      Rate and Rhythm: Normal rate and regular rhythm  Pulses: Normal pulses  Heart sounds: Normal heart sounds  No murmur heard  Pulmonary:      Effort: Pulmonary effort is normal  No respiratory distress  Abdominal:      General: Bowel sounds are normal       Palpations: Abdomen is soft  Tenderness: There is no abdominal tenderness  Musculoskeletal:         General: Normal range of motion  Cervical back: Normal range of motion and neck supple  Right lower leg: No edema  Left lower leg: No edema  Skin:     General: Skin is warm  Findings: No rash  Neurological:      General: No focal deficit present  Mental Status: He is alert and oriented to person, place, and time        Gait: Gait normal    Psychiatric:         Mood and Affect: Mood normal        Christophe Osorio MD

## 2023-03-09 NOTE — PATIENT INSTRUCTIONS
Vacuna contra la Difteria/Tosferina/Tétanos (DTaP) (Por inyección)   Diphtheria Toxoid, Adsorbed (dif-THEER-ee-a TOX-oyd, ad-SORBD), Pertussis Vaccine, Acellular (per-TUS-iss VAX-een, g-YUOY-pex-lar), Tetanus Toxoid (TET-a-nus TOX-oyd)  Le protege contra las infecciones causadas por difteria, tétanos (trismo) y pertusis (Trinidadian Remy)  Sara(s) : Adacel, Boostrix, Daptacel, Infanrix   Existen muchas otras marcas de [employer]  Armida medicamento no debe ser usado cuando:   Esta vacuna podría no ser [place] para [employer]  Castro dakota no debe recibir esta vacuna si él o patrick ha tenido yarely reacción alérgica u otra reacción grave a la vacuna del tétanos, difteria o tos ferina  Infórmele al médico si castro dakota padece de convulsiones u otros problemas del sistema nervioso  Tretha Netters de usar armida medicamento:   Inyectable  Yarely enfermera u otro miembro del personal médico le aplicará la vacuna  Esta vacuna se administra ryan yarely inyección en un músculo  La mayoría de los niños reciben Nikhil serie de 5 vacunas  Se le podría administrar vacunas adicionales al [facility]  Usted debería recibir otras hojas con información sobre esas vacunas  Asegúrese de que entiende toda la información que le proporcionan  También podrían administrarle medicamentos a castro hijo para ayudar a evitar o a tratar algunos efectos secundarios menores de la vacuna  Si olvida yarely dosis: Si esta vacuna es parte de Nikhil perdomo de [place], es importante que castro dakota las reciba todas  Mark lo posible de asistir a todas las citas programadas  Si castro dakota no asiste a Christy, programe otra lo más pronto posible  Medicamentos y [employer] que debe evitar:   Consulte con castro médico o farmacéutico antes de usar cualquier medicamento, incluyendo los que compra sin receta médica, las vitaminas y los productos herbales  Algunos alimentos y medicamentos pueden afectar la eficacia de la vacuna contra la difteria, el [place], y la tos [place]   Infórmele al médico si castro dakota recientemente ha recibido cualquiera de los siguientes:  Inmunoglobulina  Anticoagulante (ryan la warfarina)  Cualquier tratamiento que debilita el sistema inmunitario, ryan medicamentos para el cáncer, radioterapia o un esteroide  Precauciones jeet el uso de frantz medicamento:   Infórmele al médico si castro dakota sufre de un trastorno de la coagulación o antecedentes del síndrome de Guillain-Barré  También infórmele al médico si castro dakota ha tenido yumiko reacción grave a alguna vacuna, incluyendo fiebre o llanto prolongado  Asegúrese que el médico sepa si castro dakota nació prematuro  Esta vacuna puede llegar a causar los siguientes problemas:  Síndrome de Guillain-Barré  Infórmele al médico si castro dakota tiene alergia al látex o si castro dakota ha estado enfermo o ha tenido fiebre recientemente  Efectos secundarios que pueden presentarse jeet el uso de frantz medicamento:   Consulte inmediatamente con el médico si nota cualquiera de estos efectos secundarios:  Reacción alérgica: Comezón o ronchas, hinchazón del fawn o las elda, hinchazón u hormigueo en la boca o garganta, opresión en el pecho, dificultad para respirar  Llanto baldev jeet 3 o más horas  Fiebre por encima de [address] grad[address] F  Desvanecimientos o W  R  Rachael  Convulsiones  Debilidad muscular o somnolencia o sueño severo  Consulte con el médico si nota los siguientes efectos secundarios menos graves:   Nerviosismo o irritabilidad  Pérdida del apetito  Dolor leve, enrojecimiento, inflamación, sensibilidad, o yumiko protuberancia donde se administró la inyección  Consulte con el médico si nota otros efectos secundarios que neville son causados por frantz medicamento  Llame a castro médico para consultarle Becca Linder puede notificar renetta efectos secundarios al FDA al [phone]  © Copyright Jennifer Meek 2022 Information is for End User's use only and may not be sold, redistributed or otherwise used for commercial purposes    Esta información es sólo para uso en educación  Castro intención no es darle un consejo médico sobre enfermedades o tratamientos  Colsulte con castro Jet ortegaacéutico antes de seguir cualquier régimen médico para saber si es seguro y efectivo para usted

## 2023-04-06 ENCOUNTER — TELEPHONE (OUTPATIENT)
Dept: FAMILY MEDICINE CLINIC | Facility: CLINIC | Age: 48
End: 2023-04-06

## 2023-09-14 ENCOUNTER — OFFICE VISIT (OUTPATIENT)
Dept: FAMILY MEDICINE CLINIC | Facility: CLINIC | Age: 48
End: 2023-09-14

## 2023-09-14 VITALS
OXYGEN SATURATION: 99 % | BODY MASS INDEX: 26.83 KG/M2 | HEART RATE: 98 BPM | RESPIRATION RATE: 22 BRPM | WEIGHT: 177 LBS | SYSTOLIC BLOOD PRESSURE: 128 MMHG | TEMPERATURE: 97.6 F | DIASTOLIC BLOOD PRESSURE: 80 MMHG | HEIGHT: 68 IN

## 2023-09-14 DIAGNOSIS — I10 ESSENTIAL HYPERTENSION: Primary | ICD-10-CM

## 2023-09-14 DIAGNOSIS — I83.11 VARICOSE VEINS OF BOTH LOWER EXTREMITIES WITH INFLAMMATION: ICD-10-CM

## 2023-09-14 DIAGNOSIS — I83.12 VARICOSE VEINS OF BOTH LOWER EXTREMITIES WITH INFLAMMATION: ICD-10-CM

## 2023-09-14 PROCEDURE — 99213 OFFICE O/P EST LOW 20 MIN: CPT | Performed by: STUDENT IN AN ORGANIZED HEALTH CARE EDUCATION/TRAINING PROGRAM

## 2023-09-14 RX ORDER — AMLODIPINE BESYLATE 5 MG/1
5 TABLET ORAL DAILY
Qty: 90 TABLET | Refills: 2 | Status: SHIPPED | OUTPATIENT
Start: 2023-09-14

## 2023-09-14 NOTE — ASSESSMENT & PLAN NOTE
128/80  Complaint with Amlodipine 5mg qd without s/e    - F/up in 6 mo, can rpt labs   - Encouraged weight loss and dietary modifications today

## 2023-09-14 NOTE — ASSESSMENT & PLAN NOTE
asymx at this time  Is due to f/up with vascular surg next week  Exam: Nil edema, no pain.  Prominent though unchanged VV.    - Encourage usage of compression stockings

## 2023-09-14 NOTE — PROGRESS NOTES
Name: Carrol Lara      : 1975      MRN: [id number]  Encounter Provider: Damon Carpenter MD  Encounter Date: 2023   Encounter department: [facility]   1. Essential hypertension  Assessment & Plan:  128/80  Complaint with Amlodipine 5mg qd without s/e    - F/up in 6 mo, can rpt labs   - Encouraged weight loss and dietary modifications today    Orders:  -     amLODIPine (NORVASC) 5 mg tablet; Take 1 tablet (5 mg total) by mouth daily    2. Varicose veins of both lower extremities with inflammation  Assessment & Plan:  asymx at this time  Is due to f/up with vascular surg next week  Exam: Nil edema, no pain. Prominent though unchanged VV.    - Encourage usage of compression stockings      BMI Counseling: Body mass index is 26.91 kg/m². The BMI is above normal. Nutrition recommendations include decreasing portion sizes, encouraging healthy choices of fruits and vegetables, decreasing fast food intake, consuming healthier snacks and limiting drinks that contain sugar. Exercise recommendations include moderate physical activity 150 minutes/week. No pharmacotherapy was ordered. Patient referred to PCP. Rationale for BMI follow-up plan is due to patient being overweight or obese. Subjective   This is a very pleasant 49 y.o. male who presents to the clinic for management of their chronic medical conditions. Patient's medical conditions are stable unless noted otherwise above. Patient has not had any recent hospitalizations, or medical emergencies since last visit. Patient has no further complaints other than what is mentioned in the ROS. Review of Systems   Constitutional: Negative for chills and fever. HENT: Negative for ear pain and sore throat. Eyes: Negative for pain and visual disturbance. Respiratory: Negative for cough and shortness of breath. Cardiovascular: Negative for chest pain and palpitations.    Gastrointestinal: Negative for abdominal pain and vomiting. Genitourinary: Negative for dysuria and hematuria. Musculoskeletal: Negative for arthralgias and back pain. Skin: Negative for color change and rash. Neurological: Negative for seizures and syncope. All other systems reviewed and are negative. Current Outpatient Medications on File Prior to Visit   Medication Sig   • Blood Pressure Monitoring (Blood Pressure Cuff) MISC Use 2 (two) times a day   • [DISCONTINUED] amLODIPine (NORVASC) 5 mg tablet Take 1 tablet (5 mg total) by mouth daily   • [DISCONTINUED] polyethylene glycol (Golytely) 4000 mL solution Take 4,000 mL by mouth once for 1 dose Take 4000 mL by mouth once for 1 dose. Use as directed     Objective   /80 (BP Location: Left arm, Patient Position: Sitting, Cuff Size: Standard)   Pulse 98   Temp 97.6 °F (36.4 °C) (Temporal)   Resp 22   Ht 5' 8" (1.727 m)   Wt 80.3 kg (177 lb)   SpO2 99%   BMI 26.91 kg/m²     Physical Exam  Vitals and nursing note reviewed. Constitutional:       General: He is not in acute distress. Appearance: Normal appearance. He is not ill-appearing, toxic-appearing or diaphoretic. HENT:      Head: Normocephalic and atraumatic. Right Ear: External ear normal.      Left Ear: External ear normal.      Nose: Nose normal.      Mouth/Throat:      Mouth: Mucous membranes are moist.      Pharynx: No oropharyngeal exudate or posterior oropharyngeal erythema. Eyes:      General: No scleral icterus. Right eye: No discharge. Left eye: No discharge. Conjunctiva/sclera: Conjunctivae normal.   Cardiovascular:      Rate and Rhythm: Normal rate and regular rhythm. Pulses: Normal pulses. Heart sounds: Normal heart sounds. No murmur heard. Pulmonary:      Effort: Pulmonary effort is normal. No respiratory distress. Abdominal:      General: Bowel sounds are normal.      Palpations: Abdomen is soft. Tenderness:  There is no abdominal tenderness. Musculoskeletal:         General: Normal range of motion. Cervical back: Normal range of motion and neck supple. Right lower leg: No edema. Left lower leg: No edema. Comments: Varicose Veins ++   Skin:     General: Skin is warm. Findings: No rash. Neurological:      General: No focal deficit present. Mental Status: He is alert and oriented to person, place, and time.       Gait: Gait normal.   Psychiatric:         Mood and Affect: Mood normal.       Angelo Pereyra MD

## 2023-10-17 ENCOUNTER — OFFICE VISIT (OUTPATIENT)
Dept: VASCULAR SURGERY | Facility: CLINIC | Age: 48
End: 2023-10-17
Payer: COMMERCIAL

## 2023-10-17 VITALS
HEIGHT: 68 IN | WEIGHT: 176.6 LBS | SYSTOLIC BLOOD PRESSURE: 134 MMHG | BODY MASS INDEX: 26.76 KG/M2 | DIASTOLIC BLOOD PRESSURE: 90 MMHG | HEART RATE: 96 BPM

## 2023-10-17 DIAGNOSIS — I83.12 VARICOSE VEINS OF BOTH LOWER EXTREMITIES WITH INFLAMMATION: Primary | ICD-10-CM

## 2023-10-17 DIAGNOSIS — I83.11 VARICOSE VEINS OF BOTH LOWER EXTREMITIES WITH INFLAMMATION: Primary | ICD-10-CM

## 2023-10-17 PROCEDURE — 99203 OFFICE O/P NEW LOW 30 MIN: CPT

## 2023-10-17 NOTE — PROGRESS NOTES
Assessment/Plan:   1. Varicose veins of both lower extremities with inflammation  Patient is reporting worsening swelling and discomfort to bilateral lower extremities, R>L, as well as itching over bulging varicose veins. Pain is described as aching in nature and occurs after he has been on his feet for prolonged period of time. Patient also reports sensation of heaviness and fatigue, as well as swelling to bilateral lower extremities. Patient is currently denying any  numbness/tingling, color/temperature change, or tissue loss. He has not been wearing compression stockings due to difficulty obtaining them during the pandemic. Plan:  -We discussed the pathophysiology of varicose veins as well as contributing lifestyle factors.  -We discussed reinitiating conservative measures including compression stockings, elevating legs, increasing physical activity and continued weight loss. -Patient agreeable to compression stockings. Order placed for thigh-high compression stockings, patient instructed to apply in the morning and remove before bed.  -Order placed for venous reflux study to be completed in 3 months.  -Return to office in 3 months with surgeon to discuss further management options. Diagnoses and all orders for this visit:    Varicose veins of both lower extremities with inflammation  -     Ambulatory Referral to Vascular Surgery  -     Compression Stocking  -     VAS Lower extremity venous insufficiency duplex, Single leg w/ measurements; Future          Subjective:      Patient ID: Edmundo East is a 49 y.o. male     Chief Complaint:   Patient is a 57-year-old male, non-smoker, with PMH HTN and varicose veins bilateral lower extremities. Patient is presenting to the office upon referral by his PCP for the evaluation of bilateral lower extremity pain. Patient has been previously evaluated by vascular surgery in the past and was last seen via telemedicine on 4/21/2020.   A venous reflux study completed at that time revealed deep venous and superficial venous incompetence bilaterally. At that time it was recommended that patient continue with conservative management. Patient is reporting worsening swelling and discomfort to bilateral lower extremities, R>L, as well as itching over bulging varicose veins. Pain is described as aching in nature and occurs after he has been on his feet for prolonged period of time. Patient also reports sensation of heaviness and fatigue, as well as swelling to bilateral lower extremities. Patient is currently denying any  numbness/tingling, color/temperature change, or tissue loss. He has not been wearing compression stockings due to difficulty obtaining them during the pandemic.     Lars Edouard is seen for evaluation of: [x]Varicose veins/legs  []Spider veins/legs  []Spider veins/face  []Venous stasis ulcer  []Superficial thrombophlebitis  []Other -      He complains of []none  [x]bulging veins  []dilated veins  []discolored veins         There is []no edema              []right leg edema  []left leg edema       [x]bilateral lower extremity edema     There is   []no leg pain          []right leg pain  []left leg pain         []bilateral leg pain  []bilateral leg pain; L>R   [x]bilateral leg pain; R>L     Pain is described as [x]aching              [x]itching  []sharp                []burning  []throbbing         []stinging  [x]heavy                []dull  []other -      Symptoms have been ongoing for:  years   There is  [x]no pertinent medical history  []history of DVT  []PE  []superficial venous thrombosis     Prior treatment includes []none  []EVLT  [x]OTC stockings  []prescription compression stockings  []vein ligation  []vein stripping  []stab phlebectomy  []sclerotherapy injections  []Other -      Current treatment includes [x]none  []compression socks  []avoiding tight clothing  []leg elevation  []rest  []exercise  []weight management  []skin care  []periodic evaluation   []Other-     Treatment has been []effective  [x]ineffective     Review of Systems   Constitutional: Negative. HENT: Negative. Eyes: Negative. Respiratory: Negative. Cardiovascular:  Positive for leg swelling. Gastrointestinal: Negative. Endocrine: Negative. Genitourinary: Negative. Musculoskeletal: Negative. Skin: Negative. Negative for color change, pallor and wound. Allergic/Immunologic: Negative. Neurological: Negative. Negative for numbness. Hematological: Negative. Psychiatric/Behavioral: Negative. The following portions of the patient's history were reviewed and updated as appropriate: allergies, current medications, past family history, past medical history, past social history, past surgical history, and problem list.  Objective     Physical Exam  Constitutional:       General: He is not in acute distress. Appearance: Normal appearance. HENT:      Head: Normocephalic and atraumatic. Cardiovascular:      Rate and Rhythm: Normal rate and regular rhythm. Pulses:           Dorsalis pedis pulses are 2+ on the right side and 2+ on the left side. Posterior tibial pulses are 2+ on the right side and 2+ on the left side. Pulmonary:      Effort: Pulmonary effort is normal. No respiratory distress. Musculoskeletal:         General: No swelling. Right lower leg: No edema. Left lower leg: No edema. Skin:     General: Skin is warm and dry. Capillary Refill: Capillary refill takes less than 2 seconds. Findings: No lesion, rash or wound. Comments: Scattered varicose and reticular veins to bilateral lower extremities, R>L   Neurological:      General: No focal deficit present. Mental Status: He is alert and oriented to person, place, and time.    Psychiatric:         Mood and Affect: Mood normal.         Behavior: Behavior normal.       Right leg    Left leg    Objective:     Vitals: 10/17/23 1327   BP: 134/90   BP Location: Left arm   Patient Position: Sitting   Cuff Size: Large   Pulse: 96   Weight: 80.1 kg (176 lb 9.6 oz)   Height: 5' 8" (1.727 m)       Patient Active Problem List   Diagnosis    Elevated blood pressure reading    Nephrolithiasis    Varicose veins of both lower extremities with inflammation    BMI 27.0-27.9,adult    Elevated glucose level    Migraine without aura and without status migrainosus, not intractable    Intractable migraine without status migrainosus    Essential hypertension       Past Surgical History:   Procedure Laterality Date    FL RETROGRADE PYELOGRAM  05/06/2019    FL RETROGRADE PYELOGRAM  06/03/2019    LITHOTRIPSY      PA CYSTO BLADDER W/URETERAL CATHETERIZATION Left 05/06/2019    Procedure: CYSTOSCOPY RETROGRADE PYELOGRAM WITH INSERTION STENT URETERAL;  Surgeon: Katherine Douglas MD;  Location: [facility];  Service: Urology    PA CYSTO/URETERO W/LITHOTRIPSY &INDWELL STENT INSRT Left 06/03/2019    Procedure: CYSTO, URETEROSCOPY W/HOLMIUM LASER, RETROGRADE PYELOGRAM, STENT INSERTION;  Surgeon: Humaira Alberto MD;  Location: [facility];  Service: Urology       Family History   Problem Relation Age of Onset    No Known Problems Family        Social History     Socioeconomic History    Marital status: Single     Spouse name: Not on file    Number of children: Not on file    Years of education: Not on file    Highest education level: Not on file   Occupational History    Not on file   Tobacco Use    Smoking status: Never    Smokeless tobacco: Never   Vaping Use    Vaping Use: Never used   Substance and Sexual Activity    Alcohol use: Not Currently    Drug use: Never    Sexual activity: Not on file   Other Topics Concern    Not on file   Social History Narrative    Not on file     Social Determinants of Health     Financial Resource Strain: Low Risk  (3/9/2023)    Overall Financial Resource Strain (CARDIA)     Difficulty of Paying Living Expenses: Not hard at all Food Insecurity: No Food Insecurity (3/9/2023)    Hunger Vital Sign     Worried About Running Out of Food in the Last Year: Never true     Ran Out of Food in the Last Year: Never true   Transportation Needs: No Transportation Needs (3/9/2023)    PRAPARE - Transportation     Lack of Transportation (Medical): No     Lack of Transportation (Non-Medical): No   Physical Activity: Not on file   Stress: Not on file   Social Connections: Not on file   Intimate Partner Violence: Not on file   Housing Stability: Not on file       No Known Allergies      Current Outpatient Medications:     amLODIPine (NORVASC) 5 mg tablet, Take 1 tablet (5 mg total) by mouth daily, Disp: 90 tablet, Rfl: 2    Blood Pressure Monitoring (Blood Pressure Cuff) MISC, Use 2 (two) times a day, Disp: 1 each, Rfl: 0    Vitals:    10/17/23 1327   BP: 134/90   Pulse: 96     I have spent a total time of 30 minutes on 10/17/23 in caring for this patient including Prognosis, Risks and benefits of tx options, Instructions for management, Patient and family education, Importance of tx compliance, Risk factor reductions, Impressions, Counseling / Coordination of care, Documenting in the medical record, Reviewing / ordering tests, medicine, procedures  , and Obtaining or reviewing history  .

## 2023-11-05 DIAGNOSIS — I10 ESSENTIAL HYPERTENSION: ICD-10-CM

## 2023-11-06 RX ORDER — AMLODIPINE BESYLATE 5 MG/1
5 TABLET ORAL DAILY
Qty: 90 TABLET | Refills: 2 | Status: SHIPPED | OUTPATIENT
Start: 2023-11-06

## 2024-01-23 ENCOUNTER — TELEPHONE (OUTPATIENT)
Dept: UROLOGY | Facility: CLINIC | Age: 49
End: 2024-01-23

## 2024-01-23 NOTE — TELEPHONE ENCOUNTER
Voicemail left for the patient using [employer] [id number] stating his appointment on 2/2/2024 with Dr Trejo needs to be rescheduled.  Please call the office for a new date and time.     Patient is managed by Dr De Paz. Yearly visit for prostate cancer screening.  Please schedule with [facility] if possible non urgent.

## 2024-02-27 ENCOUNTER — TELEPHONE (OUTPATIENT)
Dept: FAMILY MEDICINE CLINIC | Facility: CLINIC | Age: 49
End: 2024-02-27

## 2024-03-07 ENCOUNTER — TELEPHONE (OUTPATIENT)
Dept: UROLOGY | Facility: CLINIC | Age: 49
End: 2024-03-07

## 2024-03-07 NOTE — TELEPHONE ENCOUNTER
Tried to reach patient in regards to the need to change his upcoming appointment with Heike at [facility] 4/18/24 at 3:00.  Number is disconnected.  Appointment rescheduled to 4/19/24 with Pavan at [facility] at 7:40.  Epic letter sent to patient in regards to appointment change.

## 2024-03-14 ENCOUNTER — OFFICE VISIT (OUTPATIENT)
Dept: FAMILY MEDICINE CLINIC | Facility: CLINIC | Age: 49
End: 2024-03-14

## 2024-03-14 VITALS
DIASTOLIC BLOOD PRESSURE: 88 MMHG | BODY MASS INDEX: 28.16 KG/M2 | WEIGHT: 179.44 LBS | TEMPERATURE: 97.6 F | HEIGHT: 67 IN | HEART RATE: 82 BPM | OXYGEN SATURATION: 98 % | SYSTOLIC BLOOD PRESSURE: 130 MMHG

## 2024-03-14 DIAGNOSIS — I10 ESSENTIAL HYPERTENSION: ICD-10-CM

## 2024-03-14 PROCEDURE — 99213 OFFICE O/P EST LOW 20 MIN: CPT | Performed by: FAMILY MEDICINE

## 2024-03-14 RX ORDER — AMLODIPINE BESYLATE 5 MG/1
5 TABLET ORAL DAILY
Qty: 90 TABLET | Refills: 2 | Status: SHIPPED | OUTPATIENT
Start: 2024-03-14

## 2024-03-14 NOTE — PROGRESS NOTES
Name: Terry Fuentes      : 1975      MRN: [id number]  Encounter Provider: Jerome Grimes MD  Encounter Date: 3/14/2024   Encounter department: [facility]    Assessment & Plan     1. BMI 27.0-27.9,adult  Assessment & Plan:  Wt Readings from Last 3 Encounters:   24 81.4 kg (179 lb 7 oz)   10/17/23 80.1 kg (176 lb 9.6 oz)   23 80.3 kg (177 lb)     - Patient indicates that has had some dietary indiscretions during the holidays. Encouraged the importance of maintaining a healthly lifestyle diet of low fat, low salt, low cholesterol, low carbohydrates as well as exercising at least 30 minutes at day and incorporating vegetables to the diet.   Provided healthy lifestyle guide material and encouraged adherence to the plan. He verbalized understanding and all questions were answered.       2. Essential hypertension  Assessment & Plan:  BP Readings from Last 3 Encounters:   24 130/88   10/17/23 134/90   23 128/80     Complaint with Amlodipine 5mg qd without s/e     - F/up in 6 mo, can rpt labs   - Encouraged weight loss and dietary modifications today    Orders:  -     amLODIPine (NORVASC) 5 mg tablet; Take 1 tablet (5 mg total) by mouth daily  -     TSH, 3rd generation with Free T4 reflex; Future  -     CBC and differential; Future  -     Comprehensive metabolic panel; Future  -     Lipid panel; Future           Subjective      Pt well. Declined cyracom.  States keen to continue Rx. Would like labs. Will concentrate on lifestyle interventions for weight mx      Review of Systems   Constitutional:  Negative for chills, fatigue and fever.   HENT:  Negative for trouble swallowing.    Eyes:  Negative for visual disturbance.   Respiratory:  Negative for cough and shortness of breath.    Cardiovascular:  Negative for chest pain, palpitations and leg swelling.   Gastrointestinal:  Negative for abdominal pain, blood in stool, constipation, diarrhea, nausea  "and vomiting.   Genitourinary:  Negative for dysuria and hematuria.   Musculoskeletal:  Negative for back pain and gait problem.   Skin:  Negative for rash.   Neurological:  Negative for dizziness and headaches.   Psychiatric/Behavioral:  The patient is not nervous/anxious.        Current Outpatient Medications on File Prior to Visit   Medication Sig    Blood Pressure Monitoring (Blood Pressure Cuff) MISC Use 2 (two) times a day    [DISCONTINUED] polyethylene glycol (Golytely) 4000 mL solution Take 4,000 mL by mouth once for 1 dose Take 4000 mL by mouth once for 1 dose. Use as directed       Objective     /88 (BP Location: Right arm, Patient Position: Sitting, Cuff Size: Standard)   Pulse 82   Temp 97.6 °F (36.4 °C) (Temporal)   Ht 5' 7\" (1.702 m)   Wt 81.4 kg (179 lb 7 oz)   SpO2 98%   BMI 28.10 kg/m²     Physical Exam  Vitals and nursing note reviewed.   Constitutional:       General: He is not in acute distress.     Appearance: Normal appearance. He is not ill-appearing, toxic-appearing or diaphoretic.   HENT:      Head: Normocephalic and atraumatic.      Right Ear: External ear normal.      Left Ear: External ear normal.      Nose: Nose normal.      Mouth/Throat:      Mouth: Mucous membranes are moist.      Pharynx: No oropharyngeal exudate or posterior oropharyngeal erythema.   Eyes:      General: No scleral icterus.        Right eye: No discharge.         Left eye: No discharge.      Conjunctiva/sclera: Conjunctivae normal.   Cardiovascular:      Rate and Rhythm: Normal rate and regular rhythm.      Pulses: Normal pulses.      Heart sounds: Normal heart sounds. No murmur heard.  Pulmonary:      Effort: Pulmonary effort is normal. No respiratory distress.   Abdominal:      General: Bowel sounds are normal.      Palpations: Abdomen is soft.      Tenderness: There is no abdominal tenderness.   Musculoskeletal:         General: Normal range of motion.      Cervical back: Normal range of motion and neck " supple.      Right lower leg: No edema.      Left lower leg: No edema.   Skin:     General: Skin is warm.      Findings: No rash.   Neurological:      General: No focal deficit present.      Mental Status: He is alert and oriented to person, place, and time.      Gait: Gait normal.   Psychiatric:         Mood and Affect: Mood normal.       Jerome Grimes MD

## 2024-03-18 NOTE — ASSESSMENT & PLAN NOTE
BP Readings from Last 3 Encounters:   03/14/24 130/88   10/17/23 134/90   09/14/23 128/80     Complaint with Amlodipine 5mg qd without s/e     - F/up in 6 mo, can rpt labs   - Encouraged weight loss and dietary modifications today  
Wt Readings from Last 3 Encounters:   03/14/24 81.4 kg (179 lb 7 oz)   10/17/23 80.1 kg (176 lb 9.6 oz)   09/14/23 80.3 kg (177 lb)     - Patient indicates that has had some dietary indiscretions during the holidays. Encouraged the importance of maintaining a healthly lifestyle diet of low fat, low salt, low cholesterol, low carbohydrates as well as exercising at least 30 minutes at day and incorporating vegetables to the diet.   Provided healthy lifestyle guide material and encouraged adherence to the plan. He verbalized understanding and all questions were answered.   
Vitals: tachy at 124, febrile at 103, otherwise WNL  Gen: AAOx3, NAD, sitting uncomfortably in stretcher, non-toxic  Head: ncat, perrla, eomi b/l  Neck: supple, no lymphadenopathy, no midline deviation  Heart: rrr, no m/r/g  Lungs: CTA b/l, no rales/ronchi/wheezes  Abd: soft, nontender, non-distended, no rebound or guarding  Ext: no clubbing/cyanosis/edema  Neuro: sensation and muscle strength intact b/l, steady gait  derm: no rash

## 2024-06-03 ENCOUNTER — OFFICE VISIT (OUTPATIENT)
Dept: FAMILY MEDICINE CLINIC | Facility: CLINIC | Age: 49
End: 2024-06-03

## 2024-06-03 VITALS
HEART RATE: 79 BPM | DIASTOLIC BLOOD PRESSURE: 80 MMHG | OXYGEN SATURATION: 97 % | BODY MASS INDEX: 26.98 KG/M2 | SYSTOLIC BLOOD PRESSURE: 112 MMHG | TEMPERATURE: 97.7 F | HEIGHT: 68 IN | WEIGHT: 178 LBS | RESPIRATION RATE: 16 BRPM

## 2024-06-03 DIAGNOSIS — I10 ESSENTIAL HYPERTENSION: Primary | ICD-10-CM

## 2024-06-03 PROCEDURE — 99213 OFFICE O/P EST LOW 20 MIN: CPT | Performed by: FAMILY MEDICINE

## 2024-06-03 RX ORDER — AMLODIPINE BESYLATE 5 MG/1
5 TABLET ORAL DAILY
Qty: 90 TABLET | Refills: 2 | Status: SHIPPED | OUTPATIENT
Start: 2024-06-03

## 2024-06-03 NOTE — PROGRESS NOTES
Ambulatory Visit  Name: Terry Fuentes      : 1975      MRN: [id number]  Encounter Provider: Jerome Grimes MD  Encounter Date: 6/3/2024   Encounter department: [facility]    Assessment & Plan   1. Essential hypertension  Assessment & Plan:  BP Readings from Last 3 Encounters:   24 112/80   24 130/88   10/17/23 134/90     Complaint with Amlodipine 5mg qd without s/e     - F/up in 6 mo, can rpt labs   - Encouraged weight loss and dietary modifications today  Orders:  -     amLODIPine (NORVASC) 5 mg tablet; Take 1 tablet (5 mg total) by mouth daily  2. BMI 27.0-27.9,adult  Assessment & Plan:  Wt Readings from Last 3 Encounters:   24 80.7 kg (178 lb)   24 81.4 kg (179 lb 7 oz)   10/17/23 80.1 kg (176 lb 9.6 oz)     - Encouraged the importance of maintaining a healthly lifestyle diet of low fat, low salt, low cholesterol, low carbohydrates as well as exercising at least 30 minutes at day and incorporating vegetables to the diet.   Provided healthy lifestyle guide material and encouraged adherence to the plan. He verbalized understanding and all questions were answered.        History of Present Illness     Pt returned for 6 mo Bp review. Reports being well. Denies any concerns.           Review of Systems   Constitutional:  Negative for chills and fever.   HENT:  Negative for ear pain and sore throat.    Eyes:  Negative for pain and visual disturbance.   Respiratory:  Negative for cough and shortness of breath.    Cardiovascular:  Negative for chest pain and palpitations.   Gastrointestinal:  Negative for abdominal pain and vomiting.   Genitourinary:  Negative for dysuria and hematuria.   Musculoskeletal:  Negative for arthralgias and back pain.   Skin:  Negative for color change and rash.   Neurological:  Negative for seizures and syncope.   All other systems reviewed and are negative.      Objective     /80 (BP Location: Right arm,  "Patient Position: Sitting, Cuff Size: Standard)   Pulse 79   Temp 97.7 °F (36.5 °C) (Temporal)   Resp 16   Ht 5' 8\" (1.727 m)   Wt 80.7 kg (178 lb)   SpO2 97%   BMI 27.06 kg/m²     Physical Exam  Vitals and nursing note reviewed.   Constitutional:       General: He is not in acute distress.     Appearance: He is well-developed.   HENT:      Head: Normocephalic and atraumatic.   Eyes:      Conjunctiva/sclera: Conjunctivae normal.   Cardiovascular:      Rate and Rhythm: Normal rate and regular rhythm.      Heart sounds: No murmur heard.  Pulmonary:      Effort: Pulmonary effort is normal. No respiratory distress.      Breath sounds: Normal breath sounds.   Abdominal:      Palpations: Abdomen is soft.      Tenderness: There is no abdominal tenderness.   Musculoskeletal:         General: No swelling.      Cervical back: Neck supple.   Skin:     General: Skin is warm and dry.      Capillary Refill: Capillary refill takes less than 2 seconds.   Neurological:      Mental Status: He is alert.   Psychiatric:         Mood and Affect: Mood normal.       Administrative Statements     "

## 2024-06-04 NOTE — ASSESSMENT & PLAN NOTE
Wt Readings from Last 3 Encounters:   06/03/24 80.7 kg (178 lb)   03/14/24 81.4 kg (179 lb 7 oz)   10/17/23 80.1 kg (176 lb 9.6 oz)     - Encouraged the importance of maintaining a healthly lifestyle diet of low fat, low salt, low cholesterol, low carbohydrates as well as exercising at least 30 minutes at day and incorporating vegetables to the diet.   Provided healthy lifestyle guide material and encouraged adherence to the plan. He verbalized understanding and all questions were answered.

## 2024-06-04 NOTE — ASSESSMENT & PLAN NOTE
BP Readings from Last 3 Encounters:   06/03/24 112/80   03/14/24 130/88   10/17/23 134/90     Complaint with Amlodipine 5mg qd without s/e     - F/up in 6 mo, can rpt labs   - Encouraged weight loss and dietary modifications today

## 2024-08-12 ENCOUNTER — OFFICE VISIT (OUTPATIENT)
Dept: FAMILY MEDICINE CLINIC | Facility: CLINIC | Age: 49
End: 2024-08-12

## 2024-08-12 VITALS
OXYGEN SATURATION: 99 % | RESPIRATION RATE: 18 BRPM | DIASTOLIC BLOOD PRESSURE: 108 MMHG | BODY MASS INDEX: 26.25 KG/M2 | HEIGHT: 68 IN | SYSTOLIC BLOOD PRESSURE: 146 MMHG | WEIGHT: 173.2 LBS | HEART RATE: 91 BPM | TEMPERATURE: 97.6 F

## 2024-08-12 DIAGNOSIS — R35.89 POLYURIA: ICD-10-CM

## 2024-08-12 DIAGNOSIS — R31.29 MICROSCOPIC HEMATURIA: ICD-10-CM

## 2024-08-12 DIAGNOSIS — I10 ESSENTIAL HYPERTENSION: Primary | ICD-10-CM

## 2024-08-12 LAB
BACTERIA UR QL AUTO: ABNORMAL /HPF
BILIRUB UR QL STRIP: NEGATIVE
CLARITY UR: CLEAR
COLOR UR: COLORLESS
GLUCOSE UR STRIP-MCNC: NEGATIVE MG/DL
HGB UR QL STRIP.AUTO: ABNORMAL
KETONES UR STRIP-MCNC: NEGATIVE MG/DL
LEUKOCYTE ESTERASE UR QL STRIP: NEGATIVE
NITRITE UR QL STRIP: NEGATIVE
NON-SQ EPI CELLS URNS QL MICRO: ABNORMAL /HPF
PH UR STRIP.AUTO: 7.5 [PH]
PROT UR STRIP-MCNC: NEGATIVE MG/DL
RBC #/AREA URNS AUTO: ABNORMAL /HPF
SL AMB  POCT GLUCOSE, UA: NORMAL
SL AMB LEUKOCYTE ESTERASE,UA: NORMAL
SL AMB POCT BILIRUBIN,UA: NORMAL
SL AMB POCT BLOOD,UA: NORMAL
SL AMB POCT CLARITY,UA: CLEAR
SL AMB POCT COLOR,UA: NORMAL
SL AMB POCT KETONES,UA: NORMAL
SL AMB POCT NITRITE,UA: NORMAL
SL AMB POCT PH,UA: 7.5
SL AMB POCT SPECIFIC GRAVITY,UA: 1.01
SL AMB POCT URINE PROTEIN: NORMAL
SL AMB POCT UROBILINOGEN: NORMAL
SP GR UR STRIP.AUTO: 1.01 (ref 1–1.03)
UROBILINOGEN UR STRIP-ACNC: <2 MG/DL
WBC #/AREA URNS AUTO: ABNORMAL /HPF

## 2024-08-12 PROCEDURE — 81001 URINALYSIS AUTO W/SCOPE: CPT | Performed by: STUDENT IN AN ORGANIZED HEALTH CARE EDUCATION/TRAINING PROGRAM

## 2024-08-12 RX ORDER — AMLODIPINE AND VALSARTAN 5; 160 MG/1; MG/1
1 TABLET ORAL DAILY
Qty: 100 TABLET | Refills: 3 | Status: SHIPPED | OUTPATIENT
Start: 2024-08-12

## 2024-08-12 RX ORDER — ADHESIVE BANDAGE 3/4"
BANDAGE TOPICAL 2 TIMES DAILY
Qty: 1 EACH | Refills: 0 | Status: SHIPPED | OUTPATIENT
Start: 2024-08-12

## 2024-08-12 NOTE — ASSESSMENT & PLAN NOTE
Unclear if patient's currently uncontrolled hypertension is causing patient's fatigue and dizziness.  Unclear if significantly elevated blood pressure is also contributing to microscopic hematuria seen on UA today.  - Will start ARB therapy to help control blood pressures  - Follow-up in 1 week recheck blood pressure and recheck UA  - Follow-up labs as below as indicated by results

## 2024-08-12 NOTE — PROGRESS NOTES
Ambulatory Visit  Name: Terry Fuentes      : 1975      MRN: [id number]  Encounter Provider: Elias Schoen, MD  Encounter Date: 2024   Encounter department: [facility]    Assessment & Plan   1. Essential hypertension  Assessment & Plan:  Unclear if patient's currently uncontrolled hypertension is causing patient's fatigue and dizziness.  Unclear if significantly elevated blood pressure is also contributing to microscopic hematuria seen on UA today.  - Will start ARB therapy to help control blood pressures  - Follow-up in 1 week recheck blood pressure and recheck UA  - Follow-up labs as below as indicated by results  Orders:  -     amLODIPine-valsartan (EXFORGE) 5-160 MG per tablet; Take 1 tablet by mouth daily  -     Blood Pressure Monitoring (Blood Pressure Cuff) MISC; Use 2 (two) times a day  -     Comprehensive metabolic panel; Future  -     CBC and differential; Future  -     Albumin / creatinine urine ratio; Future  2. Polyuria  Assessment & Plan:  Unclear if polyuria or just urinary frequency.  UA unremarkable besides 25 RBCs.  Unclear cause, not likely related to prediabetes status.  - Follow-up in 1 week regarding symptoms plan as above for microscopic hematuria  Orders:  -     POCT urine dip auto non-scope  -     UA (URINE) with reflex to Scope  3. Microscopic hematuria  Assessment & Plan:  Unclear cause of current symptoms.  Patient with unexplained urinary frequency.  Differential includes new onset diabetes, although A1c is still normal, blood glucose in the office was unable to be obtained due to damaged point-of-care glucose reader.  Another possibility could be prostatitis or BPH causing symptoms.  - Getting blood work as above  - Follow-up in 1 week with repeat UA, if UA still positive for blood will send for CT urogram, to consider urology referral for scope  - To consider rectal examination to check for prostatitis and/or urine  "culture  Orders:  -     UA (URINE) with reflex to Scope  -     Albumin / creatinine urine ratio; Future         History of Present Illness     Due to language barrier, an  was present during the history-taking and subsequent discussion (and for part of the physical exam) with this patient.    Patient is a 48-year-old male here for hypertension follow-up and feeling \"off\" the last 2 days    Patient reports going to work today and feeling fatigued, weak and dizzy, they took his blood pressure and it was elevated at 152/107.  He has been taking his blood pressure medication as prescribed.  Sx started yesterday with \"dizziness\". Feeling very fatigued and week.    Patient reports he has also been having urinary frequency with polydipsia, his mother has prediabetes.  He has no history of prediabetes.  He denies any other urinary symptoms.      Review of Systems   Constitutional:  Positive for fatigue. Negative for fever.   Eyes:  Negative for visual disturbance.   Respiratory:  Negative for shortness of breath.    Cardiovascular:  Negative for chest pain and palpitations.   Gastrointestinal:  Negative for abdominal pain, constipation and diarrhea.   Endocrine: Positive for polydipsia, polyphagia and polyuria.   Genitourinary:  Negative for dysuria.   Skin:  Negative for rash.   Neurological:  Positive for dizziness and weakness.        No vertigo     Past Medical History:   Diagnosis Date    Hypertension     Kidney stone     Urinary pain     Wears glasses      Past Surgical History:   Procedure Laterality Date    FL RETROGRADE PYELOGRAM  05/06/2019    FL RETROGRADE PYELOGRAM  06/03/2019    LITHOTRIPSY      ID CYSTO BLADDER W/URETERAL CATHETERIZATION Left 05/06/2019    Procedure: CYSTOSCOPY RETROGRADE PYELOGRAM WITH INSERTION STENT URETERAL;  Surgeon: Jerome Gaston MD;  Location: [facility];  Service: Urology    ID CYSTO/URETERO W/LITHOTRIPSY &INDWELL STENT INSRT Left 06/03/2019    Procedure: CYSTO, " "URETEROSCOPY W/HOLMIUM LASER, RETROGRADE PYELOGRAM, STENT INSERTION;  Surgeon: Antonio De Paz MD;  Location: [facility];  Service: Urology     Family History   Problem Relation Age of Onset    No Known Problems Family      Social History     Tobacco Use    Smoking status: Never    Smokeless tobacco: Never   Vaping Use    Vaping status: Never Used   Substance and Sexual Activity    Alcohol use: Not Currently    Drug use: Never    Sexual activity: Not on file     Current Outpatient Medications on File Prior to Visit   Medication Sig    [DISCONTINUED] amLODIPine (NORVASC) 5 mg tablet Take 1 tablet (5 mg total) by mouth daily    [DISCONTINUED] Blood Pressure Monitoring (Blood Pressure Cuff) MISC Use 2 (two) times a day    [DISCONTINUED] polyethylene glycol (Golytely) 4000 mL solution Take 4,000 mL by mouth once for 1 dose Take 4000 mL by mouth once for 1 dose. Use as directed     No Known Allergies  Immunization History   Administered Date(s) Administered    COVID-19 PFIZER VACCINE 0.3 ML IM 07/02/2021, 07/23/2021     Objective     BP (!) 146/108 (BP Location: Left arm, Patient Position: Sitting, Cuff Size: Standard) Comment: Manual  Pulse 91   Temp 97.6 °F (36.4 °C) (Temporal)   Resp 18   Ht 5' 8\" (1.727 m)   Wt 78.6 kg (173 lb 3.2 oz)   SpO2 99%   BMI 26.33 kg/m²     Physical Exam  Constitutional:       Appearance: Normal appearance.   HENT:      Mouth/Throat:      Mouth: Mucous membranes are moist.   Eyes:      Pupils: Pupils are equal, round, and reactive to light.   Cardiovascular:      Rate and Rhythm: Normal rate and regular rhythm.   Pulmonary:      Effort: Pulmonary effort is normal.      Breath sounds: Normal breath sounds.   Abdominal:      General: Abdomen is flat. There is no distension.      Palpations: Abdomen is soft.      Tenderness: There is no abdominal tenderness.   Musculoskeletal:         General: Normal range of motion.      Cervical back: Normal range of motion.   Skin:     General: Skin " is warm.   Neurological:      General: No focal deficit present.      Mental Status: He is alert.   Psychiatric:         Mood and Affect: Mood normal.         Behavior: Behavior normal.

## 2024-08-12 NOTE — ASSESSMENT & PLAN NOTE
Unclear cause of current symptoms.  Patient with unexplained urinary frequency.  Differential includes new onset diabetes, although A1c is still normal, blood glucose in the office was unable to be obtained due to damaged point-of-care glucose reader.  Another possibility could be prostatitis or BPH causing symptoms.  - Getting blood work as above  - Follow-up in 1 week with repeat UA, if UA still positive for blood will send for CT urogram, to consider urology referral for scope  - To consider rectal examination to check for prostatitis and/or urine culture

## 2024-08-12 NOTE — ASSESSMENT & PLAN NOTE
Unclear if polyuria or just urinary frequency.  UA unremarkable besides 25 RBCs.  Unclear cause, not likely related to prediabetes status.  - Follow-up in 1 week regarding symptoms plan as above for microscopic hematuria

## 2024-08-17 ENCOUNTER — APPOINTMENT (OUTPATIENT)
Dept: LAB | Facility: HOSPITAL | Age: 49
End: 2024-08-17
Payer: COMMERCIAL

## 2024-08-17 DIAGNOSIS — R31.29 MICROSCOPIC HEMATURIA: ICD-10-CM

## 2024-08-17 DIAGNOSIS — R03.0 ELEVATED BLOOD PRESSURE READING: ICD-10-CM

## 2024-08-17 DIAGNOSIS — I10 ESSENTIAL HYPERTENSION: ICD-10-CM

## 2024-08-19 ENCOUNTER — OFFICE VISIT (OUTPATIENT)
Dept: FAMILY MEDICINE CLINIC | Facility: CLINIC | Age: 49
End: 2024-08-19

## 2024-08-19 VITALS
OXYGEN SATURATION: 99 % | HEART RATE: 89 BPM | HEIGHT: 68 IN | WEIGHT: 172 LBS | TEMPERATURE: 98.3 F | BODY MASS INDEX: 26.07 KG/M2 | SYSTOLIC BLOOD PRESSURE: 121 MMHG | RESPIRATION RATE: 18 BRPM | DIASTOLIC BLOOD PRESSURE: 87 MMHG

## 2024-08-19 DIAGNOSIS — R10.9 FLANK PAIN: ICD-10-CM

## 2024-08-19 DIAGNOSIS — R31.29 MICROSCOPIC HEMATURIA: ICD-10-CM

## 2024-08-19 DIAGNOSIS — I10 ESSENTIAL HYPERTENSION: Primary | ICD-10-CM

## 2024-08-19 DIAGNOSIS — Z87.442 HISTORY OF KIDNEY STONES: ICD-10-CM

## 2024-08-19 LAB
SL AMB  POCT GLUCOSE, UA: ABNORMAL
SL AMB LEUKOCYTE ESTERASE,UA: ABNORMAL
SL AMB POCT BILIRUBIN,UA: ABNORMAL
SL AMB POCT BLOOD,UA: ABNORMAL
SL AMB POCT CLARITY,UA: CLEAR
SL AMB POCT COLOR,UA: ABNORMAL
SL AMB POCT KETONES,UA: ABNORMAL
SL AMB POCT NITRITE,UA: ABNORMAL
SL AMB POCT PH,UA: 7.5
SL AMB POCT SPECIFIC GRAVITY,UA: 1.01
SL AMB POCT URINE PROTEIN: ABNORMAL
SL AMB POCT UROBILINOGEN: ABNORMAL

## 2024-08-19 PROCEDURE — 99213 OFFICE O/P EST LOW 20 MIN: CPT | Performed by: FAMILY MEDICINE

## 2024-08-19 PROCEDURE — 81003 URINALYSIS AUTO W/O SCOPE: CPT | Performed by: FAMILY MEDICINE

## 2024-08-19 RX ORDER — TAMSULOSIN HYDROCHLORIDE 0.4 MG/1
0.4 CAPSULE ORAL DAILY PRN
Qty: 30 CAPSULE | Refills: 5 | Status: SHIPPED | OUTPATIENT
Start: 2024-08-19

## 2024-08-19 NOTE — PROGRESS NOTES
Ambulatory Visit  Name: Terry Fuentes      : 1975      MRN: [id number]  Encounter Provider: Elias Schoen, MD  Encounter Date: 2024   Encounter department: [facility]    Assessment & Plan   1. Essential hypertension  Assessment & Plan:  Blood pressure well-controlled at visit today.  No need to change current regimen at this time.  2. Microscopic hematuria  Assessment & Plan:  Unclear cause of microscopic hematuria.  Most likely due to renal calculi.  Less likely malignancy of urinary tract or nephritis.  Will start workup with CT renal protocol.  - Based on results of CT renal protocol will refer patient to urology  - Patient follow-up in 6 weeks after CT completed  - Follow-up sooner if needed  Orders:  -     POCT urine dip auto non-scope  -     CT renal protocol; Future; Expected date: 2024  3. Flank pain  Assessment & Plan:  Suspect bilateral flank pain is related to renal colic.  Checking CT renal protocol as below.  No concerning or infectious symptoms at this time  - Tamsulosin for as needed use, may help with flank pain  - Follow-up results as indicated  Orders:  -     CT renal protocol; Future; Expected date: 2024  -     tamsulosin (FLOMAX) 0.4 mg; Take 1 capsule (0.4 mg total) by mouth daily as needed (flank pain)  4. History of kidney stones  -     CT renal protocol; Future; Expected date: 2024       History of Present Illness     Patient is a 48-year-old male here for follow-up hypertension and microscopic hematuria    Patient reports he is feeling well on his new blood pressure medication.  He notes no adverse side effects.  He notes that he is making lifestyle changes including increasing exercise and working on his diet.  He has cut down on salt intake as well.    Patient reports new bilateral flank pain, worse on the left than the right, states this is been present for about a week now.  He states the pain comes and goes.  He  "is taking Tylenol with a little bit of relief.  He had renal stones in the past but that was much more severe, he needed surgery almost immediately when he had that before.  He denies any systemic symptoms, fever chills nausea vomiting.        Review of Systems   Constitutional:  Negative for chills, fatigue and fever.   Gastrointestinal:  Negative for abdominal pain, constipation and diarrhea.   Genitourinary:  Positive for flank pain. Negative for dysuria, frequency, hematuria and urgency.   Skin:  Negative for rash.   Neurological:  Negative for dizziness.       Objective     /87 (BP Location: Left arm, Patient Position: Sitting, Cuff Size: Large)   Pulse 89   Temp 98.3 °F (36.8 °C) (Temporal)   Resp 18   Ht 5' 8\" (1.727 m)   Wt 78 kg (172 lb)   SpO2 99%   BMI 26.15 kg/m²     Physical Exam  Constitutional:       Appearance: Normal appearance.   HENT:      Mouth/Throat:      Mouth: Mucous membranes are moist.   Eyes:      Pupils: Pupils are equal, round, and reactive to light.   Cardiovascular:      Rate and Rhythm: Normal rate and regular rhythm.   Pulmonary:      Effort: Pulmonary effort is normal.   Abdominal:      General: Abdomen is flat.      Palpations: Abdomen is soft.   Musculoskeletal:         General: Normal range of motion.      Cervical back: Normal range of motion.   Skin:     General: Skin is warm.   Neurological:      General: No focal deficit present.      Mental Status: He is alert.   Psychiatric:         Mood and Affect: Mood normal.         Behavior: Behavior normal.       Administrative Statements     "

## 2024-08-19 NOTE — ASSESSMENT & PLAN NOTE
Unclear cause of microscopic hematuria.  Most likely due to renal calculi.  Less likely malignancy of urinary tract or nephritis.  Will start workup with CT renal protocol.  - Based on results of CT renal protocol will refer patient to urology  - Patient follow-up in 6 weeks after CT completed  - Follow-up sooner if needed

## 2024-08-19 NOTE — ASSESSMENT & PLAN NOTE
Suspect bilateral flank pain is related to renal colic.  Checking CT renal protocol as below.  No concerning or infectious symptoms at this time  - Tamsulosin for as needed use, may help with flank pain  - Follow-up results as indicated

## 2024-08-29 ENCOUNTER — HOSPITAL ENCOUNTER (OUTPATIENT)
Dept: CT IMAGING | Facility: HOSPITAL | Age: 49
Discharge: HOME | End: 2024-08-29
Payer: COMMERCIAL

## 2024-08-29 DIAGNOSIS — R31.29 MICROSCOPIC HEMATURIA: ICD-10-CM

## 2024-08-29 DIAGNOSIS — Z87.442 HISTORY OF KIDNEY STONES: ICD-10-CM

## 2024-08-29 DIAGNOSIS — R10.9 FLANK PAIN: ICD-10-CM

## 2024-08-29 PROCEDURE — 74178 CT ABD&PLV WO CNTR FLWD CNTR: CPT

## 2024-08-29 RX ADMIN — IOHEXOL 100 ML: 350 INJECTION, SOLUTION INTRAVENOUS at 18:51

## 2024-09-13 ENCOUNTER — OFFICE VISIT (OUTPATIENT)
Dept: UROLOGY | Facility: CLINIC | Age: 49
End: 2024-09-13
Payer: COMMERCIAL

## 2024-09-13 VITALS
HEART RATE: 98 BPM | SYSTOLIC BLOOD PRESSURE: 110 MMHG | HEIGHT: 68 IN | WEIGHT: 172 LBS | DIASTOLIC BLOOD PRESSURE: 80 MMHG | OXYGEN SATURATION: 98 % | BODY MASS INDEX: 26.07 KG/M2

## 2024-09-13 DIAGNOSIS — Z12.5 SCREENING FOR PROSTATE CANCER: Primary | ICD-10-CM

## 2024-09-13 DIAGNOSIS — N20.1 URETERAL STONE: ICD-10-CM

## 2024-09-13 DIAGNOSIS — N20.0 NEPHROLITHIASIS: ICD-10-CM

## 2024-09-13 PROCEDURE — 99213 OFFICE O/P EST LOW 20 MIN: CPT

## 2024-09-13 NOTE — PROGRESS NOTES
Office Visit- Urology  Terry Fuentes 1975 MRN: [id number]      Assessment/Discussion/Plan    48 y.o. male managed by     Nephrolithiasis  -5 mm stone in the distal left ureter noted on CT urogram on 8/29/2024 with no hydronephrosis  -Trial Flomax for  expulsive medical therapy with 4 to 6 weeks  -Reviewed ER parameters  -Strainer given  -Repeat CT scan in 4 to 6 weeks to determine persistence of ureteral stone which would require surgical intervention.  Follow-up appointment to discuss surgery if required    2.  Prostate cancer screening  -Last PSA was 1.3 in November 2022  -Due for updated PSA now  -CT urogram demonstrated coarse calcifications seen within the prostate as well as asymmetric enlargement of the left seminal vesicle compared to right but this is similar to prior study in 2019  -SHAUN today with no palpable nodularity    Chief Complaint:   Terry is a 48 y.o. male presenting to the office for a follow up visit regarding nephrolithiasis        Subjective    Patient is a 48-year-old male with a history of nephrolithiasis who presents to the office today for follow-up.  Visit conducted through use of  he had a CT urogram ordered because of hematuria by his PCP demonstrated a 5 mm distal left ureteral stone with no significant hydronephrosis.  There is been no new stone passage.  No fevers or chills. No significant pain      ROS:   Review of Systems   Constitutional: Negative.  Negative for chills, fatigue and fever.   HENT: Negative.     Respiratory:  Negative for shortness of breath.    Cardiovascular:  Negative for chest pain.   Gastrointestinal: Negative.  Negative for abdominal pain.   Endocrine: Negative.    Musculoskeletal: Negative.    Skin: Negative.    Neurological: Negative.  Negative for dizziness and light-headedness.   Hematological: Negative.    Psychiatric/Behavioral: Negative.           Past Medical History  Past Medical History:   Diagnosis Date    Hypertension      Kidney stone     Urinary pain     Wears glasses        Past Surgical History  Past Surgical History:   Procedure Laterality Date    FL RETROGRADE PYELOGRAM  05/06/2019    FL RETROGRADE PYELOGRAM  06/03/2019    LITHOTRIPSY      NC CYSTO BLADDER W/URETERAL CATHETERIZATION Left 05/06/2019    Procedure: CYSTOSCOPY RETROGRADE PYELOGRAM WITH INSERTION STENT URETERAL;  Surgeon: Jerome Gaston MD;  Location:  MAIN OR;  Service: Urology    NC CYSTO/URETERO W/LITHOTRIPSY &INDWELL STENT INSRT Left 06/03/2019    Procedure: CYSTO, URETEROSCOPY W/HOLMIUM LASER, RETROGRADE PYELOGRAM, STENT INSERTION;  Surgeon: Antonio De Paz MD;  Location: [facility];  Service: Urology       Past Family History  Family History   Problem Relation Age of Onset    No Known Problems Family        Past Social history  Social History     Socioeconomic History    Marital status: Single     Spouse name: Not on file    Number of children: Not on file    Years of education: Not on file    Highest education level: Not on file   Occupational History    Not on file   Tobacco Use    Smoking status: Never    Smokeless tobacco: Never   Vaping Use    Vaping status: Never Used   Substance and Sexual Activity    Alcohol use: Not Currently    Drug use: Never    Sexual activity: Not on file   Other Topics Concern    Not on file   Social History Narrative    Not on file     Social Determinants of Health     Financial Resource Strain: High Risk (3/14/2024)    Overall Financial Resource Strain (CARDIA)     Difficulty of Paying Living Expenses: Very hard   Food Insecurity: Food Insecurity Present (3/14/2024)    Hunger Vital Sign     Worried About Running Out of Food in the Last Year: Sometimes true     Ran Out of Food in the Last Year: Sometimes true   Transportation Needs: No Transportation Needs (3/14/2024)    PRAPARE - Transportation     Lack of Transportation (Medical): No     Lack of Transportation (Non-Medical): No   Physical Activity: Not on file   Stress: Not  "on file   Social Connections: Not on file   Intimate Partner Violence: Not on file   Housing Stability: Unknown (3/14/2024)    Housing Stability Vital Sign     Unable to Pay for Housing in the Last Year: No     Number of Times Moved in the Last Year: Not on file     Homeless in the Last Year: No       Current Medications  Current Outpatient Medications   Medication Sig Dispense Refill    amLODIPine-valsartan (EXFORGE) 5-160 MG per tablet Take 1 tablet by mouth daily 100 tablet 3    Blood Pressure Monitoring (Blood Pressure Cuff) MISC Use 2 (two) times a day 1 each 0    tamsulosin (FLOMAX) 0.4 mg Take 1 capsule (0.4 mg total) by mouth daily as needed (flank pain) 30 capsule 5     No current facility-administered medications for this visit.       Allergies  No Known Allergies    OBJECTIVE    Vitals   Vitals:    09/13/24 1413   BP: 110/80   BP Location: Left arm   Patient Position: Sitting   Cuff Size: Adult   Pulse: 98   SpO2: 98%   Weight: 78 kg (172 lb)   Height: 5' 8\" (1.727 m)       PVR:    Physical Exam  Constitutional:       General: He is not in acute distress.     Appearance: Normal appearance. He is normal weight. He is not ill-appearing or toxic-appearing.   HENT:      Head: Normocephalic and atraumatic.   Eyes:      Conjunctiva/sclera: Conjunctivae normal.   Cardiovascular:      Rate and Rhythm: Normal rate.   Pulmonary:      Effort: Pulmonary effort is normal. No respiratory distress.   Skin:     General: Skin is warm and dry.   Neurological:      General: No focal deficit present.      Mental Status: He is alert and oriented to person, place, and time.      Cranial Nerves: No cranial nerve deficit.   Psychiatric:         Mood and Affect: Mood normal.         Behavior: Behavior normal.         Thought Content: Thought content normal.          Labs:     Lab Results   Component Value Date    PSA 1.3 11/30/2022     Lab Results   Component Value Date    CREATININE 0.88 08/17/2024      Lab Results   Component " Value Date    HGBA1C 5.6 08/12/2022     Lab Results   Component Value Date    CALCIUM 9.3 08/17/2024    K 4.3 08/17/2024    CO2 29 08/17/2024     08/17/2024    BUN 11 08/17/2024    CREATININE 0.88 08/17/2024       I have personally reviewed all pertinent lab results and reviewed with patient    Imaging   CT ABDOMEN AND PELVIS WITH AND WITHOUT IV CONTRAST     INDICATION: R31.29: Other microscopic hematuria  R10.9: Unspecified abdominal pain  Z87.442: Personal history of urinary calculi.     COMPARISON: CT abdomen pelvis 5/6/2019     TECHNIQUE: Initial CT of the abdomen and pelvis was performed without intravenous contrast. Subsequent dynamic CT evaluation of the abdomen and pelvis was performed after the administration of contrast in both nephrographic and delayed phases.    Multiplanar 2D reformatted images were created from the source data.     This examination, like all CT scans performed in the [facility], was performed utilizing techniques to minimize radiation dose exposure, including the use of iterative reconstruction and automated exposure control. Radiation dose length   product (DLP) for this visit: 1583 mGy-cm     IV Contrast: 100 mL of iohexol (OMNIPAQUE)  Enteric Contrast: Not administered.     FINDINGS:     ABDOMEN     RIGHT KIDNEY AND URETER:  No solid renal mass or detectable urothelial mass. Subcentimeter hypoattenuating renal lesion(s), too small to characterize but statistically likely benign, which do not warrant follow-up (Radiology June 2019).  No hydronephrosis or hydroureter.  10 mm stone in the lower pole of the right kidney. Several additional tiny stones measuring up to 3 mm.  No perinephric collection.     LEFT KIDNEY AND URETER:  No solid renal mass or detectable urothelial mass.  No hydronephrosis or hydroureter.  5 mm stone left distal ureter a couple centimeters proximal to the left ureterovesical junction. Several additional stones within the left kidney  measuring 5 mm or less.  No perinephric collection.     URINARY BLADDER:  No bladder wall mass.  No calculi.        LOWER CHEST: No clinically significant abnormality in the visualized lower chest.     LIVER/BILIARY TREE: Unremarkable.     GALLBLADDER: No calcified gallstones. No pericholecystic inflammatory change.     SPLEEN: Unremarkable.     PANCREAS: Unremarkable.     ADRENAL GLANDS: Unremarkable.     STOMACH AND BOWEL: Unremarkable.     APPENDIX: Normal.     ABDOMINOPELVIC CAVITY: No ascites. No pneumoperitoneum. No lymphadenopathy.     VESSELS: Unremarkable for patient's age.     PELVIS     REPRODUCTIVE ORGANS: Coarse calcification again seen in the prostate gland. The left seminal vesicle is asymmetrically larger than the right, but this is similar to the prior study.     ABDOMINAL WALL/INGUINAL REGIONS: Small bilateral fat-containing inguinal hernias. Small fat-containing umbilical hernia.     BONES: No acute fracture or suspicious osseous lesion.     IMPRESSION:     There is a 5 mm stone in the left distal ureter. No hydronephrosis.     Bilateral nephrolithiasis as described above.     The study was marked in EPIC for significant notification.     Workstation performed: [id number]       Pavan Cedillo PA-C  Date: 9/13/2024 Time: 2:24 PM  [facility]    This note was written using fluency dictation software. Please excuse any resulting minor grammatical errors.

## 2024-09-26 ENCOUNTER — HOSPITAL ENCOUNTER (OUTPATIENT)
Dept: CT IMAGING | Facility: HOSPITAL | Age: 49
Discharge: HOME | End: 2024-09-26
Payer: COMMERCIAL

## 2024-09-26 DIAGNOSIS — N20.1 URETERAL STONE: ICD-10-CM

## 2024-09-26 PROCEDURE — 74176 CT ABD & PELVIS W/O CONTRAST: CPT

## 2024-10-02 PROBLEM — R10.9 FLANK PAIN: Status: RESOLVED | Noted: 2024-08-19 | Resolved: 2024-10-02

## 2024-10-03 ENCOUNTER — OFFICE VISIT (OUTPATIENT)
Dept: FAMILY MEDICINE CLINIC | Facility: CLINIC | Age: 49
End: 2024-10-03

## 2024-10-03 VITALS
WEIGHT: 171 LBS | BODY MASS INDEX: 25.91 KG/M2 | DIASTOLIC BLOOD PRESSURE: 64 MMHG | OXYGEN SATURATION: 97 % | HEART RATE: 68 BPM | RESPIRATION RATE: 16 BRPM | SYSTOLIC BLOOD PRESSURE: 100 MMHG | HEIGHT: 68 IN | TEMPERATURE: 98 F

## 2024-10-03 DIAGNOSIS — Z87.442 HISTORY OF KIDNEY STONES: ICD-10-CM

## 2024-10-03 DIAGNOSIS — Z00.00 ANNUAL PHYSICAL EXAM: Primary | ICD-10-CM

## 2024-10-03 DIAGNOSIS — I10 ESSENTIAL HYPERTENSION: ICD-10-CM

## 2024-10-03 DIAGNOSIS — Z28.21 IMMUNIZATION DECLINED: ICD-10-CM

## 2024-10-03 DIAGNOSIS — R21 RASH: ICD-10-CM

## 2024-10-03 PROCEDURE — 82360 CALCULUS ASSAY QUANT: CPT | Performed by: STUDENT IN AN ORGANIZED HEALTH CARE EDUCATION/TRAINING PROGRAM

## 2024-10-03 PROCEDURE — 99396 PREV VISIT EST AGE 40-64: CPT | Performed by: FAMILY MEDICINE

## 2024-10-03 PROCEDURE — 99213 OFFICE O/P EST LOW 20 MIN: CPT | Performed by: FAMILY MEDICINE

## 2024-10-03 RX ORDER — HYDROCORTISONE 25 MG/G
OINTMENT TOPICAL 2 TIMES DAILY
Qty: 28 G | Refills: 2 | Status: SHIPPED | OUTPATIENT
Start: 2024-10-03

## 2024-10-03 NOTE — PROGRESS NOTES
Adult Annual Physical  Name: Terry Pulido      : 1975      MRN: [id number]  Encounter Provider: Elias Schoen, MD  Encounter Date: 10/3/2024   Encounter department: [facility]    Assessment & Plan  Annual physical exam         Essential hypertension  No issues on current medication therapy  - Continue current therapy at current dosing       History of kidney stones  Patient recently passed a kidney stone.  He provided this in the office so we can send it out for evaluation.  He had a renal CT scan demonstrating bilateral radiopaque kidney stones in the renal pelvis.  - Sending patient for [facility] urinalysis to help determine how we can modify patient's diet/medications in order to prevent recurrence of kidney stones  - Sending kidney stone for analysis  - Recommending patient drink at least 2.5 L/day which has been shown to decrease kidney stone recurrence regardless of type  Orders:    Stone analysis    [facility] Kidney Stone Panel; Future    [facility] Kidney Stone Panel    Rash  Would can to fight area between patient's legs, low concern for fungal based on appearance.  Likely due to legs rubbing together.  Low-dose steroid ointment to help with itching and help resolved underlying skin irritation  Orders:    hydrocortisone 2.5 % ointment; Apply topically 2 (two) times a day    Immunization declined  All immunizations declined       Immunizations and preventive care screenings were discussed with patient today. Appropriate education was printed on patient's after visit summary.    Discussed risks and benefits of prostate cancer screening. We discussed the controversial history of PSA screening for prostate cancer in the [place] as well as the risk of over detection and over treatment of prostate cancer by way of PSA screening.  The patient understands that PSA blood testing is an imperfect way to screen for prostate cancer and that elevated PSA levels in  the blood may also be caused by infection, inflammation, prostatic trauma or manipulation, urological procedures, or by benign prostatic enlargement.    The role of the digital rectal examination in prostate cancer screening was also discussed and I discussed with him that there is large interobserver variability in the findings of digital rectal examination.    Counseling:  Alcohol/drug use: discussed moderation in alcohol intake, the recommendations for healthy alcohol use, and avoidance of illicit drug use.  Dental Health: discussed importance of regular tooth brushing, flossing, and dental visits.  Injury prevention: discussed safety/seat belts, safety helmets, smoke detectors, carbon monoxide detectors, and smoking near bedding or upholstery.  Sexual health: discussed sexually transmitted diseases, partner selection, use of condoms, avoidance of unintended pregnancy, and contraceptive alternatives.  Exercise: the importance of regular exercise/physical activity was discussed. Recommend exercise 3-5 times per week for at least 30 minutes.     BMI Counseling: Body mass index is 26 kg/m². The BMI is above normal. Nutrition recommendations include encouraging healthy choices of fruits and vegetables and limiting drinks that contain sugar. Exercise recommendations include exercising 3-5 times per week. No pharmacotherapy was ordered. Patient referred to PCP. Rationale for BMI follow-up plan is due to patient being overweight or obese.         History of Present Illness     Adult Annual Physical:  Patient presents for annual physical. Due to language barrier, an  was present during the history-taking and subsequent discussion (and for part of the physical exam) with this patient.  .     Diet and Physical Activity:  - Diet/Nutrition: well balanced diet, consuming 3-5 servings of fruits/vegetables daily and adequate whole grain intake.  - Exercise: walking.    General Health:  - Sleep: 7-8 hours of sleep on  "average.  - Hearing: normal hearing bilateral ears.  - Vision: wears glasses and most recent eye exam < 1 year ago.  - Dental: regular dental visits and brushes teeth twice daily.     Health:    - Urinary symptoms: none.     Review of Systems   Constitutional:  Negative for fatigue and fever.   Respiratory:  Negative for shortness of breath.    Gastrointestinal:  Negative for abdominal pain, constipation and diarrhea.   Genitourinary:  Negative for dysuria.   Skin:  Positive for rash (on inner thighs).   Neurological:  Negative for dizziness.         Objective     /64 (BP Location: Right arm, Patient Position: Sitting, Cuff Size: Standard)   Pulse 68   Temp 98 °F (36.7 °C) (Temporal)   Resp 16   Ht 5' 8\" (1.727 m)   Wt 77.6 kg (171 lb)   SpO2 97%   BMI 26.00 kg/m²     Physical Exam  Vitals and nursing note reviewed.   Constitutional:       General: He is not in acute distress.     Appearance: Normal appearance. He is well-developed.   HENT:      Head: Normocephalic and atraumatic.      Mouth/Throat:      Mouth: Mucous membranes are moist.   Eyes:      Conjunctiva/sclera: Conjunctivae normal.      Pupils: Pupils are equal, round, and reactive to light.   Cardiovascular:      Rate and Rhythm: Normal rate and regular rhythm.      Heart sounds: No murmur heard.  Pulmonary:      Effort: Pulmonary effort is normal. No respiratory distress.      Breath sounds: Normal breath sounds.   Abdominal:      Palpations: Abdomen is soft.      Tenderness: There is no abdominal tenderness.   Musculoskeletal:         General: No swelling. Normal range of motion.      Cervical back: Normal range of motion and neck supple.   Skin:     General: Skin is warm and dry.      Capillary Refill: Capillary refill takes less than 2 seconds.      Findings: Rash (hyperpigmented and excoriated/lichinefied) present.   Neurological:      General: No focal deficit present.      Mental Status: He is alert.   Psychiatric:         Mood and " Affect: Mood normal.         Behavior: Behavior normal.

## 2024-10-03 NOTE — ASSESSMENT & PLAN NOTE
Patient recently passed a kidney stone.  He provided this in the office so we can send it out for evaluation.  He had a renal CT scan demonstrating bilateral radiopaque kidney stones in the renal pelvis.  - Sending patient for [facility] urinalysis to help determine how we can modify patient's diet/medications in order to prevent recurrence of kidney stones  - Sending kidney stone for analysis  - Recommending patient drink at least 2.5 L/day which has been shown to decrease kidney stone recurrence regardless of type  Orders:    Stone analysis    [facility] Kidney Stone Panel; Future    [facility] Kidney Stone Panel

## 2024-10-04 ENCOUNTER — TELEPHONE (OUTPATIENT)
Dept: UROLOGY | Facility: CLINIC | Age: 49
End: 2024-10-04

## 2024-10-04 NOTE — TELEPHONE ENCOUNTER
----- Message from Pavan Cedillo PA-C sent at 10/4/2024  3:23 PM EDT -----  Please call patient to report that thankfully he passed left ureteral stone.  Can follow-up in 1 year.  Does not need to be seen in November but he does not have any urologic concerns

## 2024-10-11 LAB
COLOR STONE: NORMAL
COM MFR STONE: 30 %
COMMENT-STONE3: NORMAL
COMPOSITION: NORMAL
HYDROXYAPATITE 24H ENGDIFF UR: 70 %
LABORATORY COMMENT REPORT: NORMAL
PHOTO: NORMAL
SIZE STONE: NORMAL MM
SPEC SOURCE SUBJ: NORMAL
STONE ANALYSIS-IMP: NORMAL
STONE ANALYSIS-IMP: NORMAL
WT STONE: 99 MG

## 2025-02-05 ENCOUNTER — OFFICE VISIT (OUTPATIENT)
Dept: UROLOGY | Facility: CLINIC | Age: 50
End: 2025-02-05
Payer: COMMERCIAL

## 2025-02-05 VITALS
HEART RATE: 78 BPM | HEIGHT: 68 IN | BODY MASS INDEX: 26.22 KG/M2 | OXYGEN SATURATION: 98 % | SYSTOLIC BLOOD PRESSURE: 110 MMHG | WEIGHT: 173 LBS | DIASTOLIC BLOOD PRESSURE: 80 MMHG

## 2025-02-05 DIAGNOSIS — N20.0 NEPHROLITHIASIS: Primary | ICD-10-CM

## 2025-02-05 PROCEDURE — 99213 OFFICE O/P EST LOW 20 MIN: CPT

## 2025-02-05 NOTE — PROGRESS NOTES
Office Visit- Urology  Terry Pulido 1975 MRN: [id number]      Assessment/Discussion/Plan    49 y.o. male managed by     Nephrolithiasis  -5 mm stone in the distal left ureter noted on CT urogram on 8/29/2024 with no hydronephrosis  -Ct stone study in September 2024 with confirmation of passage of stone   -US in September 2025 for continued surveillance      2.  Prostate cancer screening  -Last PSA was 1.3 in November 2022  -patient did not obtain updated PSA. Asked patient to obtain this week   -CT urogram demonstrated coarse calcifications seen within the prostate as well as asymmetric enlargement of the left seminal vesicle compared to right but this is similar to prior study in 2019  -SHAUN in September without palpable nodularity          Chief Complaint:   Terry is a 49 y.o. male presenting to the office for a follow up visit regarding  nephrolithiasis        Subjective    Hx 9/13/2024  Patient is a 48-year-old male with a history of nephrolithiasis who presents to the office today for follow-up.  Visit conducted through use of  he had a CT urogram ordered because of hematuria by his PCP demonstrated a 5 mm distal left ureteral stone with no significant hydronephrosis.  There is been no new stone passage.  No fevers or chills. No significant pain     2/5/2025  Patient presents the office today for follow-up.  Reviewed CT imaging with him he denies any flank pain or gross hematuria did not obtain an updated PSA      ROS:   Review of Systems   Constitutional: Negative.  Negative for chills, fatigue and fever.   HENT: Negative.     Respiratory:  Negative for shortness of breath.    Cardiovascular:  Negative for chest pain.   Gastrointestinal: Negative.  Negative for abdominal pain.   Endocrine: Negative.    Musculoskeletal: Negative.    Skin: Negative.    Neurological: Negative.  Negative for dizziness and light-headedness.   Hematological: Negative.    Psychiatric/Behavioral: Negative.            Past Medical History  Past Medical History:   Diagnosis Date    Hypertension     Kidney stone     Urinary pain     Wears glasses        Past Surgical History  Past Surgical History:   Procedure Laterality Date    FL RETROGRADE PYELOGRAM  05/06/2019    FL RETROGRADE PYELOGRAM  06/03/2019    LITHOTRIPSY      MA CYSTO/URETERO W/LITHOTRIPSY &INDWELL STENT INSRT Left 06/03/2019    Procedure: CYSTO, URETEROSCOPY W/HOLMIUM LASER, RETROGRADE PYELOGRAM, STENT INSERTION;  Surgeon: Antonio De Paz MD;  Location: [facility];  Service: Urology    MA CYSTOURETHROSCOPY W/URETERAL CATHETERIZATION Left 05/06/2019    Procedure: CYSTOSCOPY RETROGRADE PYELOGRAM WITH INSERTION STENT URETERAL;  Surgeon: Jerome Gaston MD;  Location: [facility];  Service: Urology       Past Family History  Family History   Problem Relation Age of Onset    No Known Problems Family        Past Social history  Social History     Socioeconomic History    Marital status: Single     Spouse name: Not on file    Number of children: Not on file    Years of education: Not on file    Highest education level: Not on file   Occupational History    Not on file   Tobacco Use    Smoking status: Never    Smokeless tobacco: Never   Vaping Use    Vaping status: Never Used   Substance and Sexual Activity    Alcohol use: Not Currently    Drug use: Never    Sexual activity: Not on file   Other Topics Concern    Not on file   Social History Narrative    Not on file     Social Drivers of Health     Financial Resource Strain: High Risk (3/14/2024)    Overall Financial Resource Strain (CARDIA)     Difficulty of Paying Living Expenses: Very hard   Food Insecurity: Food Insecurity Present (3/14/2024)    Nursing - Inadequate Food Risk Classification     Worried About Running Out of Food in the Last Year: Sometimes true     Ran Out of Food in the Last Year: Sometimes true     Ran Out of Food in the Last Year: Not on file   Transportation Needs: No Transportation Needs  "(3/14/2024)    PRAPARE - Transportation     Lack of Transportation (Medical): No     Lack of Transportation (Non-Medical): No   Physical Activity: Not on file   Stress: Not on file   Social Connections: Not on file   Intimate Partner Violence: Not on file   Housing Stability: Unknown (3/14/2024)    Housing Stability Vital Sign     Unable to Pay for Housing in the Last Year: No     Number of Times Moved in the Last Year: Not on file     Homeless in the Last Year: No       Current Medications  Current Outpatient Medications   Medication Sig Dispense Refill    amLODIPine-valsartan (EXFORGE) 5-160 MG per tablet Take 1 tablet by mouth daily 100 tablet 3    Blood Pressure Monitoring (Blood Pressure Cuff) MISC Use 2 (two) times a day (Patient not taking: Reported on 2/5/2025) 1 each 0    hydrocortisone 2.5 % ointment Apply topically 2 (two) times a day (Patient not taking: Reported on 2/5/2025) 28 g 2    tamsulosin (FLOMAX) 0.4 mg Take 1 capsule (0.4 mg total) by mouth daily as needed (flank pain) (Patient not taking: Reported on 2/5/2025) 30 capsule 5     No current facility-administered medications for this visit.       Allergies  No Known Allergies    OBJECTIVE    Vitals   Vitals:    02/05/25 1409   BP: 110/80   BP Location: Left arm   Patient Position: Sitting   Cuff Size: Adult   Pulse: 78   SpO2: 98%   Weight: 78.5 kg (173 lb)   Height: 5' 8\" (1.727 m)       PVR:    Physical Exam  Constitutional:       General: He is not in acute distress.     Appearance: Normal appearance. He is normal weight. He is not ill-appearing or toxic-appearing.   HENT:      Head: Normocephalic and atraumatic.   Eyes:      Conjunctiva/sclera: Conjunctivae normal.   Cardiovascular:      Rate and Rhythm: Normal rate.   Pulmonary:      Effort: Pulmonary effort is normal. No respiratory distress.   Skin:     General: Skin is warm and dry.   Neurological:      General: No focal deficit present.      Mental Status: He is alert and oriented to " person, place, and time.      Cranial Nerves: No cranial nerve deficit.   Psychiatric:         Mood and Affect: Mood normal.         Behavior: Behavior normal.         Thought Content: Thought content normal.       Labs:     Lab Results   Component Value Date    PSA 1.3 11/30/2022     Lab Results   Component Value Date    CREATININE 0.88 08/17/2024      Lab Results   Component Value Date    HGBA1C 5.6 08/12/2022     Lab Results   Component Value Date    CALCIUM 9.3 08/17/2024    K 4.3 08/17/2024    CO2 29 08/17/2024     08/17/2024    BUN 11 08/17/2024    CREATININE 0.88 08/17/2024       I have personally reviewed all pertinent lab results and reviewed with patient    Imaging       Pavan Cedillo PA-C  Date: 2/5/2025 Time: 2:36 PM  [facility]    This note was written using fluency dictation software. Please excuse any resulting minor grammatical errors.

## 2025-03-04 ENCOUNTER — RESULTS FOLLOW-UP (OUTPATIENT)
Dept: FAMILY MEDICINE CLINIC | Facility: CLINIC | Age: 50
End: 2025-03-04

## 2025-07-15 ENCOUNTER — OFFICE VISIT (OUTPATIENT)
Dept: FAMILY MEDICINE CLINIC | Facility: CLINIC | Age: 50
End: 2025-07-15

## 2025-07-15 VITALS
OXYGEN SATURATION: 97 % | BODY MASS INDEX: 26.52 KG/M2 | WEIGHT: 175 LBS | RESPIRATION RATE: 18 BRPM | TEMPERATURE: 98.5 F | HEART RATE: 85 BPM | SYSTOLIC BLOOD PRESSURE: 116 MMHG | HEIGHT: 68 IN | DIASTOLIC BLOOD PRESSURE: 70 MMHG

## 2025-07-15 DIAGNOSIS — Z87.442 HISTORY OF KIDNEY STONES: ICD-10-CM

## 2025-07-15 DIAGNOSIS — I10 ESSENTIAL HYPERTENSION: Primary | ICD-10-CM

## 2025-07-15 DIAGNOSIS — R21 RASH: ICD-10-CM

## 2025-07-15 PROBLEM — E66.3 OVERWEIGHT: Status: ACTIVE | Noted: 2025-07-15

## 2025-07-15 PROBLEM — R35.89 POLYURIA: Status: RESOLVED | Noted: 2024-08-12 | Resolved: 2025-07-15

## 2025-07-15 PROBLEM — G43.009 MIGRAINE WITHOUT AURA AND WITHOUT STATUS MIGRAINOSUS, NOT INTRACTABLE: Status: RESOLVED | Noted: 2020-05-05 | Resolved: 2025-07-15

## 2025-07-15 PROCEDURE — 99213 OFFICE O/P EST LOW 20 MIN: CPT | Performed by: FAMILY MEDICINE

## 2025-07-15 RX ORDER — AMLODIPINE AND VALSARTAN 5; 160 MG/1; MG/1
1 TABLET ORAL DAILY
Qty: 100 TABLET | Refills: 3 | Status: SHIPPED | OUTPATIENT
Start: 2025-07-15

## 2025-07-15 NOTE — PROGRESS NOTES
Name: Terry Pulido      : 1975      MRN: [id number]  Encounter Provider: Yu Puentes MD  Encounter Date: 7/15/2025   Encounter department: [facility]  :  Assessment & Plan  Essential hypertension  - Average BP levels at home 120/80  - Compliant with Amlodipine-valsartan 5-160mg  - Last alb/cr ratio 13 (24)  - Rec DASH diet, cardiovascular exercise 150 minutes a week.  - Continue current meds  - RTC 3 months  Orders:    amLODIPine-valsartan (EXFORGE) 5-160 MG per tablet; Take 1 tablet by mouth daily    Rash  Localized in forehead, right sided hyperpigmentation and pruritus worsened by sun exposure.   - Continue sunscreen. Advised to use mineral sun screen at least 50 spf daily and re-apply every 2 hours, specially with extensive sun exposure.  - Benadryl topical OTC for itchiness , as constant scratching might be contributing to discoloration  - RTC for further treatment if it worsens       History of kidney stones  - Stable, no symptoms.  - Follow up with Urology as needed               History of Present Illness   The pt is a 50 y/o male that presents to the visit due to hx of HTN. He reports BP readings average at home of 120/80 mmhg. Pt reports walking daily for exercise, and works at a job that requires high activity levels as well. Reports following a healthy diet, with fruits and vagetables and limiting fats and sugar. Does not limit salts. Compliant with medication. Denies chest pain, SOB, palpitations.     The pt presents due to irritation associated to a rash and pruritus in right sided forehead worsening the past 3 days. Pt noticed a hyperpigmented flat lesion 3 months ago that he associated to sun exposure. The lesion was around 2 inches in diameter, slightly pruritic, non painful, with no discharge. The patient reports applying daily sunscreen and the lesion had been gradually reducing in size. However, it has increasingly been pruritic  "for the past 3 days after spending the weekend walking outside in the sun. Denies similar lesions anywhere else in the body.        Review of Systems   Constitutional:  Negative for chills and fever.   HENT:  Negative for ear pain and sore throat.    Eyes:  Negative for pain and visual disturbance.   Respiratory:  Negative for cough and shortness of breath.    Cardiovascular:  Negative for chest pain and palpitations.   Gastrointestinal:  Negative for abdominal pain and vomiting.   Genitourinary:  Negative for dysuria, flank pain and hematuria.   Musculoskeletal:  Negative for arthralgias and back pain.   Skin:  Positive for rash. Negative for color change.   Neurological:  Negative for seizures and syncope.   All other systems reviewed and are negative.      Objective   /70 (BP Location: Right arm, Patient Position: Sitting, Cuff Size: Standard)   Pulse 85   Temp 98.5 °F (36.9 °C) (Temporal)   Resp 18   Ht 5' 8\" (1.727 m)   Wt 79.4 kg (175 lb)   SpO2 97%   BMI 26.61 kg/m²      Physical Exam  Constitutional:       Appearance: Normal appearance.   HENT:      Head: Normocephalic and atraumatic.      Right Ear: Tympanic membrane and ear canal normal.      Left Ear: Tympanic membrane and ear canal normal.      Nose: Nose normal.      Mouth/Throat:      Mouth: Mucous membranes are moist.      Pharynx: Oropharynx is clear.     Eyes:      Extraocular Movements: Extraocular movements intact.      Conjunctiva/sclera: Conjunctivae normal.      Pupils: Pupils are equal, round, and reactive to light.       Cardiovascular:      Rate and Rhythm: Normal rate and regular rhythm.      Pulses: Normal pulses.      Heart sounds: Normal heart sounds. No murmur heard.  Pulmonary:      Effort: Pulmonary effort is normal.      Breath sounds: Normal breath sounds.     Musculoskeletal:         General: Normal range of motion.     Skin:     General: Skin is warm.      Capillary Refill: Capillary refill takes less than 2 seconds. "           Comments: Slight non demarcated hyperpigmentation of skin in right sided forehead near hair line. No erythema, flaking.     Neurological:      General: No focal deficit present.      Mental Status: He is alert.     Psychiatric:         Behavior: Behavior normal.         Thought Content: Thought content normal.

## (undated) DEVICE — LASER FIBER HOLMIUM 272MICRON

## (undated) DEVICE — 3000CC GUARDIAN II: Brand: GUARDIAN

## (undated) DEVICE — GLOVE SRG BIOGEL 7.5

## (undated) DEVICE — GLOVE SRG BIOGEL ECLIPSE 7.5

## (undated) DEVICE — CATH FOLEY 12FR 5ML 2 WAY SILICONE ELASTIMER

## (undated) DEVICE — UROCATCH BAG

## (undated) DEVICE — GLOVE INDICATOR PI UNDERGLOVE SZ 8 BLUE

## (undated) DEVICE — TUBING SUCTION 5MM X 12 FT

## (undated) DEVICE — PACK TUR

## (undated) DEVICE — GUIDEWIRE STRGHT TIP 0.035 IN  SOLO PLUS

## (undated) DEVICE — SPECIMEN CONTAINER STERILE PEEL PACK

## (undated) DEVICE — SCD SEQUENTIAL COMPRESSION COMFORT SLEEVE MEDIUM KNEE LENGTH: Brand: KENDALL SCD

## (undated) DEVICE — SHEATH URETERAL ACCESS 12/14FR 35CM PROXIS

## (undated) DEVICE — CATH URETERAL 5FR X 70 CM FLEX TIP POLYUR BARD